# Patient Record
Sex: FEMALE | Race: WHITE | NOT HISPANIC OR LATINO | Employment: OTHER | URBAN - METROPOLITAN AREA
[De-identification: names, ages, dates, MRNs, and addresses within clinical notes are randomized per-mention and may not be internally consistent; named-entity substitution may affect disease eponyms.]

---

## 2017-01-01 ENCOUNTER — HOSPITAL ENCOUNTER (INPATIENT)
Facility: HOSPITAL | Age: 82
LOS: 4 days | End: 2017-05-27
Attending: EMERGENCY MEDICINE | Admitting: INTERNAL MEDICINE

## 2017-01-01 ENCOUNTER — HOSPITAL ENCOUNTER (INPATIENT)
Facility: HOSPITAL | Age: 82
LOS: 3 days | Discharge: NURSING, REHAB OR LONG-TERM CARE | End: 2017-03-10
Attending: EMERGENCY MEDICINE | Admitting: FAMILY MEDICINE

## 2017-01-01 ENCOUNTER — APPOINTMENT (OUTPATIENT)
Dept: GENERAL RADIOLOGY | Facility: HOSPITAL | Age: 82
End: 2017-01-01

## 2017-01-01 ENCOUNTER — APPOINTMENT (OUTPATIENT)
Dept: CARDIOLOGY | Facility: HOSPITAL | Age: 82
End: 2017-01-01
Attending: INTERNAL MEDICINE

## 2017-01-01 ENCOUNTER — APPOINTMENT (OUTPATIENT)
Dept: ULTRASOUND IMAGING | Facility: HOSPITAL | Age: 82
End: 2017-01-01

## 2017-01-01 ENCOUNTER — HOSPITAL ENCOUNTER (OUTPATIENT)
Dept: OTHER | Facility: HOSPITAL | Age: 82
Discharge: HOME | End: 2017-01-04

## 2017-01-01 ENCOUNTER — APPOINTMENT (OUTPATIENT)
Dept: CT IMAGING | Facility: HOSPITAL | Age: 82
End: 2017-01-01
Attending: INTERNAL MEDICINE

## 2017-01-01 VITALS
RESPIRATION RATE: 4 BRPM | WEIGHT: 138.8 LBS | DIASTOLIC BLOOD PRESSURE: 66 MMHG | TEMPERATURE: 98.4 F | OXYGEN SATURATION: 52 % | SYSTOLIC BLOOD PRESSURE: 106 MMHG | BODY MASS INDEX: 25.54 KG/M2 | HEIGHT: 62 IN

## 2017-01-01 VITALS
DIASTOLIC BLOOD PRESSURE: 66 MMHG | RESPIRATION RATE: 16 BRPM | OXYGEN SATURATION: 91 % | BODY MASS INDEX: 22.21 KG/M2 | HEART RATE: 105 BPM | WEIGHT: 125.38 LBS | TEMPERATURE: 97 F | HEIGHT: 63 IN | SYSTOLIC BLOOD PRESSURE: 103 MMHG

## 2017-01-01 DIAGNOSIS — J44.1 COPD EXACERBATION (HCC): ICD-10-CM

## 2017-01-01 DIAGNOSIS — B86 SCABIES: ICD-10-CM

## 2017-01-01 DIAGNOSIS — Z74.09 IMPAIRED MOBILITY AND ADLS: ICD-10-CM

## 2017-01-01 DIAGNOSIS — J96.01 ACUTE RESPIRATORY FAILURE WITH HYPOXIA (HCC): Primary | ICD-10-CM

## 2017-01-01 DIAGNOSIS — Z74.09 IMPAIRED FUNCTIONAL MOBILITY, BALANCE, GAIT, AND ENDURANCE: ICD-10-CM

## 2017-01-01 DIAGNOSIS — E87.6 HYPOKALEMIA: ICD-10-CM

## 2017-01-01 DIAGNOSIS — Z78.9 IMPAIRED MOBILITY AND ADLS: ICD-10-CM

## 2017-01-01 DIAGNOSIS — A41.9 SEPSIS, DUE TO UNSPECIFIED ORGANISM: ICD-10-CM

## 2017-01-01 DIAGNOSIS — J96.22 ACUTE ON CHRONIC RESPIRATORY FAILURE WITH HYPOXIA AND HYPERCAPNIA (HCC): Primary | ICD-10-CM

## 2017-01-01 DIAGNOSIS — R06.02 SHORTNESS OF BREATH: ICD-10-CM

## 2017-01-01 DIAGNOSIS — J96.21 ACUTE ON CHRONIC RESPIRATORY FAILURE WITH HYPOXIA AND HYPERCAPNIA (HCC): Primary | ICD-10-CM

## 2017-01-01 DIAGNOSIS — J18.9 PNEUMONIA OF BOTH LUNGS DUE TO INFECTIOUS ORGANISM, UNSPECIFIED PART OF LUNG: ICD-10-CM

## 2017-01-01 DIAGNOSIS — R41.0 CONFUSION: ICD-10-CM

## 2017-01-01 DIAGNOSIS — R13.10 DYSPHAGIA, UNSPECIFIED TYPE: ICD-10-CM

## 2017-01-01 DIAGNOSIS — R53.1 WEAKNESS: ICD-10-CM

## 2017-01-01 LAB
ALBUMIN SERPL-MCNC: 3.4 G/DL (ref 3.2–4.8)
ALBUMIN SERPL-MCNC: 4.3 G/DL (ref 3.2–4.8)
ALBUMIN/GLOB SERPL: 1.3 G/DL (ref 1.5–2.5)
ALBUMIN/GLOB SERPL: 1.5 G/DL (ref 1.5–2.5)
ALP SERPL-CCNC: 111 U/L (ref 25–100)
ALP SERPL-CCNC: 97 U/L (ref 25–100)
ALT SERPL W P-5'-P-CCNC: 15 U/L (ref 7–40)
ALT SERPL W P-5'-P-CCNC: 24 U/L (ref 7–40)
ANION GAP SERPL CALCULATED.3IONS-SCNC: 10 MMOL/L (ref 3–11)
ANION GAP SERPL CALCULATED.3IONS-SCNC: 10 MMOL/L (ref 3–11)
ANION GAP SERPL CALCULATED.3IONS-SCNC: 13 MMOL/L (ref 3–11)
ANION GAP SERPL CALCULATED.3IONS-SCNC: 2 MMOL/L (ref 3–11)
ANION GAP SERPL CALCULATED.3IONS-SCNC: 3 MMOL/L (ref 3–11)
ANION GAP SERPL CALCULATED.3IONS-SCNC: 3 MMOL/L (ref 3–11)
ANION GAP SERPL CALCULATED.3IONS-SCNC: 5 MMOL/L (ref 3–11)
ANION GAP SERPL CALCULATED.3IONS-SCNC: 9 MMOL/L (ref 3–11)
AORTIC DIMENSIONLESS INDEX: 0.4 CM2
ARTERIAL PATENCY WRIST A: ABNORMAL
ARTERIAL PATENCY WRIST A: POSITIVE
AST SERPL-CCNC: 19 U/L (ref 0–33)
AST SERPL-CCNC: 33 U/L (ref 0–33)
ATMOSPHERIC PRESS: ABNORMAL MMHG
BACTERIA BLD CULT: ABNORMAL
BACTERIA SPEC AEROBE CULT: ABNORMAL
BACTERIA SPEC AEROBE CULT: NORMAL
BACTERIA SPEC AEROBE CULT: NORMAL
BACTERIA SPEC RESP CULT: ABNORMAL
BACTERIA UR QL AUTO: ABNORMAL /HPF
BACTERIA UR QL AUTO: ABNORMAL /HPF
BASE EXCESS BLDA CALC-SCNC: 9 MMOL/L (ref 0–2)
BASE EXCESS BLDA CALC-SCNC: 9.8 MMOL/L (ref 0–2)
BASE EXCESS BLDV CALC-SCNC: 12 MMOL/L (ref -2–2)
BASOPHILS # BLD AUTO: 0.02 10*3/MM3 (ref 0–0.2)
BASOPHILS # BLD AUTO: 0.02 10*3/MM3 (ref 0–0.2)
BASOPHILS # BLD AUTO: 0.03 10*3/MM3 (ref 0–0.2)
BASOPHILS # BLD AUTO: 0.04 10*3/MM3 (ref 0–0.2)
BASOPHILS # BLD AUTO: 0.07 THOUS (ref 0–0.2)
BASOPHILS NFR BLD AUTO: 0.1 % (ref 0–1)
BASOPHILS NFR BLD AUTO: 0.1 % (ref 0–1)
BASOPHILS NFR BLD AUTO: 0.2 % (ref 0–1)
BASOPHILS NFR BLD AUTO: 0.4 % (ref 0–1)
BASOPHILS NFR BLD AUTO: 0.7 % (ref 0–2.5)
BDY SITE: ABNORMAL
BH CV ECHO MEAS - AI DEC SLOPE: 262.4 CM/SEC^2
BH CV ECHO MEAS - AI MAX PG: 46.1 MMHG
BH CV ECHO MEAS - AI MAX VEL: 339.3 CM/SEC
BH CV ECHO MEAS - AI P1/2T: 378.8 MSEC
BH CV ECHO MEAS - AO MAX PG (FULL): 54 MMHG
BH CV ECHO MEAS - AO MAX PG: 53.7 MMHG
BH CV ECHO MEAS - AO MEAN PG (FULL): 28.7 MMHG
BH CV ECHO MEAS - AO MEAN PG: 32 MMHG
BH CV ECHO MEAS - AO ROOT AREA (BSA CORRECTED): 1.8
BH CV ECHO MEAS - AO ROOT AREA: 7 CM^2
BH CV ECHO MEAS - AO ROOT DIAM: 3 CM
BH CV ECHO MEAS - AO V2 MAX: 368 CM/SEC
BH CV ECHO MEAS - AO V2 MEAN: 269.4 CM/SEC
BH CV ECHO MEAS - AO V2 VTI: 67 CM
BH CV ECHO MEAS - AVA(I,A): 0.95 CM^2
BH CV ECHO MEAS - AVA(I,D): 1.1 CM^2
BH CV ECHO MEAS - AVA(V,A): 1 CM^2
BH CV ECHO MEAS - AVA(V,D): 1 CM^2
BH CV ECHO MEAS - BSA(HAYCOCK): 1.7 M^2
BH CV ECHO MEAS - BSA: 1.6 M^2
BH CV ECHO MEAS - BZI_BMI: 25.2 KILOGRAMS/M^2
BH CV ECHO MEAS - BZI_METRIC_HEIGHT: 157.5 CM
BH CV ECHO MEAS - BZI_METRIC_WEIGHT: 62.6 KG
BH CV ECHO MEAS - EDV(CUBED): 118.9 ML
BH CV ECHO MEAS - EDV(TEICH): 113.7 ML
BH CV ECHO MEAS - EF(CUBED): 66.9 %
BH CV ECHO MEAS - EF(TEICH): 58.3 %
BH CV ECHO MEAS - ESV(CUBED): 39.3 ML
BH CV ECHO MEAS - ESV(TEICH): 47.5 ML
BH CV ECHO MEAS - FS: 30.8 %
BH CV ECHO MEAS - IVS/LVPW: 1
BH CV ECHO MEAS - IVSD: 1.1 CM
BH CV ECHO MEAS - LA DIMENSION: 3 CM
BH CV ECHO MEAS - LA/AO: 1
BH CV ECHO MEAS - LV MASS(C)D: 211.6 GRAMS
BH CV ECHO MEAS - LV MASS(C)DI: 129.6 GRAMS/M^2
BH CV ECHO MEAS - LV MAX PG: 5.8 MMHG
BH CV ECHO MEAS - LV MEAN PG: 2.9 MMHG
BH CV ECHO MEAS - LV V1 MAX: 120.9 CM/SEC
BH CV ECHO MEAS - LV V1 MEAN: 75.6 CM/SEC
BH CV ECHO MEAS - LV V1 VTI: 24.4 CM
BH CV ECHO MEAS - LVIDD: 4.9 CM
BH CV ECHO MEAS - LVIDS: 3.4 CM
BH CV ECHO MEAS - LVOT AREA (M): 3.1 CM^2
BH CV ECHO MEAS - LVOT AREA: 3.1 CM^2
BH CV ECHO MEAS - LVOT DIAM: 2 CM
BH CV ECHO MEAS - LVPWD: 1.1 CM
BH CV ECHO MEAS - MV A MAX VEL: 139.2 CM/SEC
BH CV ECHO MEAS - MV DEC SLOPE: 485.1 CM/SEC^2
BH CV ECHO MEAS - MV DEC TIME: 0.31 SEC
BH CV ECHO MEAS - MV E MAX VEL: 105.1 CM/SEC
BH CV ECHO MEAS - MV E/A: 0.76
BH CV ECHO MEAS - MV P1/2T MAX VEL: 102.6 CM/SEC
BH CV ECHO MEAS - MV P1/2T: 61.9 MSEC
BH CV ECHO MEAS - MVA P1/2T LCG: 2.1 CM^2
BH CV ECHO MEAS - MVA(P1/2T): 3.6 CM^2
BH CV ECHO MEAS - PA ACC SLOPE: 1016 CM/SEC^2
BH CV ECHO MEAS - PA ACC TIME: 0.1 SEC
BH CV ECHO MEAS - PA PR(ACCEL): 32.7 MMHG
BH CV ECHO MEAS - RAP SYSTOLE: 3 MMHG
BH CV ECHO MEAS - RV MAX PG: 2.1 MMHG
BH CV ECHO MEAS - RV V1 MAX: 72.1 CM/SEC
BH CV ECHO MEAS - RVSP: 31.2 MMHG
BH CV ECHO MEAS - SI(AO): 338.1 ML/M^2
BH CV ECHO MEAS - SI(CUBED): 48.7 ML/M^2
BH CV ECHO MEAS - SI(LVOT): 45.8 ML/M^2
BH CV ECHO MEAS - SI(TEICH): 40.6 ML/M^2
BH CV ECHO MEAS - SV(AO): 552 ML
BH CV ECHO MEAS - SV(CUBED): 79.5 ML
BH CV ECHO MEAS - SV(LVOT): 74.7 ML
BH CV ECHO MEAS - SV(TEICH): 66.3 ML
BH CV ECHO MEAS - TAPSE (>1.6): 1.8 CM2
BH CV ECHO MEAS - TR MAX VEL: 265.4 CM/SEC
BH CV XLRA - RV BASE: 3.2 CM
BH CV XLRA - RV LENGTH: 6 CM
BH CV XLRA - RV MID: 3.3 CM
BH CV XLRA - TDI S': 13 CM/SEC
BILIRUB SERPL-MCNC: 0.3 MG/DL (ref 0.3–1.2)
BILIRUB SERPL-MCNC: 0.4 MG/DL (ref 0.3–1.2)
BILIRUB UR QL STRIP: NEGATIVE
BNP SERPL-MCNC: 128 PG/ML (ref 0–100)
BNP SERPL-MCNC: 144 PG/ML (ref 0–100)
BUN BLD-MCNC: 13 MG/DL (ref 9–23)
BUN BLD-MCNC: 14 MG/DL (ref 9–23)
BUN BLD-MCNC: 15 MG/DL (ref 9–23)
BUN BLD-MCNC: 22 MG/DL (ref 9–23)
BUN BLD-MCNC: 25 MG/DL (ref 9–23)
BUN BLD-MCNC: 26 MG/DL (ref 9–23)
BUN BLD-MCNC: 29 MG/DL (ref 9–23)
BUN BLD-MCNC: 31 MG/DL (ref 9–23)
BUN/CREAT SERPL: 21.7 (ref 7–25)
BUN/CREAT SERPL: 23.3 (ref 7–25)
BUN/CREAT SERPL: 30 (ref 7–25)
BUN/CREAT SERPL: 32.5 (ref 7–25)
BUN/CREAT SERPL: 35.7 (ref 7–25)
BUN/CREAT SERPL: 36.3 (ref 7–25)
BUN/CREAT SERPL: 44 (ref 7–25)
BUN/CREAT SERPL: 44.3 (ref 7–25)
CA-I SERPL ISE-MCNC: 1.11 MMOL/L (ref 1.12–1.32)
CALCIUM SPEC-SCNC: 9.1 MG/DL (ref 8.7–10.4)
CALCIUM SPEC-SCNC: 9.2 MG/DL (ref 8.7–10.4)
CALCIUM SPEC-SCNC: 9.2 MG/DL (ref 8.7–10.4)
CALCIUM SPEC-SCNC: 9.3 MG/DL (ref 8.7–10.4)
CALCIUM SPEC-SCNC: 9.5 MG/DL (ref 8.7–10.4)
CALCIUM SPEC-SCNC: 9.5 MG/DL (ref 8.7–10.4)
CALCIUM SPEC-SCNC: 9.7 MG/DL (ref 8.7–10.4)
CALCIUM SPEC-SCNC: 9.7 MG/DL (ref 8.7–10.4)
CHLORIDE SERPL-SCNC: 100 MMOL/L (ref 99–109)
CHLORIDE SERPL-SCNC: 101 MMOL/L (ref 99–109)
CHLORIDE SERPL-SCNC: 101 MMOL/L (ref 99–109)
CHLORIDE SERPL-SCNC: 105 MMOL/L (ref 99–109)
CHLORIDE SERPL-SCNC: 106 MMOL/L (ref 99–109)
CHLORIDE SERPL-SCNC: 92 MMOL/L (ref 99–109)
CHLORIDE SERPL-SCNC: 93 MMOL/L (ref 99–109)
CHLORIDE SERPL-SCNC: 96 MMOL/L (ref 99–109)
CLARITY UR: ABNORMAL
CLARITY UR: CLEAR
CO2 BLDA-SCNC: 35.7 MMOL/L (ref 22–33)
CO2 BLDA-SCNC: 35.7 MMOL/L (ref 22–33)
CO2 BLDA-SCNC: 40.4 MMOL/L (ref 22–33)
CO2 SERPL-SCNC: 32 MMOL/L (ref 20–31)
CO2 SERPL-SCNC: 33 MMOL/L (ref 20–31)
CO2 SERPL-SCNC: 34 MMOL/L (ref 20–31)
CO2 SERPL-SCNC: 34 MMOL/L (ref 20–31)
CO2 SERPL-SCNC: 36 MMOL/L (ref 20–31)
CO2 SERPL-SCNC: 36 MMOL/L (ref 20–31)
CO2 SERPL-SCNC: 38 MMOL/L (ref 20–31)
CO2 SERPL-SCNC: 40 MMOL/L (ref 20–31)
COHGB MFR BLD: 1.7 % (ref 0–2)
COHGB MFR BLD: 1.8 % (ref 0–2)
COHGB MFR BLD: 1.9 % (ref 0–2)
COLOR UR: YELLOW
CONV ABS IMM GRAN: 0.05 THOUS (ref 0–0.6)
CONV IMMATURE GRAN: 0.5 % (ref 0–2.5)
CREAT BLD-MCNC: 0.5 MG/DL (ref 0.6–1.3)
CREAT BLD-MCNC: 0.5 MG/DL (ref 0.6–1.3)
CREAT BLD-MCNC: 0.6 MG/DL (ref 0.6–1.3)
CREAT BLD-MCNC: 0.6 MG/DL (ref 0.6–1.3)
CREAT BLD-MCNC: 0.7 MG/DL (ref 0.6–1.3)
CREAT BLD-MCNC: 0.7 MG/DL (ref 0.6–1.3)
CREAT BLD-MCNC: 0.8 MG/DL (ref 0.6–1.3)
CREAT BLD-MCNC: 0.8 MG/DL (ref 0.6–1.3)
CRP SERPL-MCNC: 82.7 MG/DL (ref 0–10)
D-LACTATE SERPL-SCNC: 1.8 MMOL/L (ref 0.5–2)
D-LACTATE SERPL-SCNC: 2.1 MMOL/L (ref 0.5–2)
D-LACTATE SERPL-SCNC: 2.6 MMOL/L (ref 0.5–2)
D-LACTATE SERPL-SCNC: 2.9 MMOL/L (ref 0.5–2)
DEPRECATED RDW RBC AUTO: 55.4 FL (ref 37–54)
DEPRECATED RDW RBC AUTO: 56.3 FL (ref 37–54)
DEPRECATED RDW RBC AUTO: 66.9 FL (ref 37–54)
DEPRECATED RDW RBC AUTO: 70 FL (ref 37–54)
DEPRECATED RDW RBC AUTO: 71.7 FL (ref 37–54)
EOSINOPHIL # BLD AUTO: 0 10*3/MM3 (ref 0.1–0.3)
EOSINOPHIL # BLD AUTO: 0 10*3/MM3 (ref 0.1–0.3)
EOSINOPHIL # BLD AUTO: 0.05 10*3/MM3 (ref 0.1–0.3)
EOSINOPHIL # BLD AUTO: 0.1 THOUS (ref 0–0.7)
EOSINOPHIL # BLD AUTO: 0.3 10*3/MM3 (ref 0.1–0.3)
EOSINOPHIL # BLD AUTO: 1.1 % (ref 0–7)
EOSINOPHIL NFR BLD AUTO: 0 % (ref 0–3)
EOSINOPHIL NFR BLD AUTO: 0 % (ref 0–3)
EOSINOPHIL NFR BLD AUTO: 0.3 % (ref 0–3)
EOSINOPHIL NFR BLD AUTO: 3.3 % (ref 0–3)
ERYTHROCYTE [DISTWIDTH] IN BLOOD BY AUTOMATED COUNT: 15.3 % (ref 11.3–14.5)
ERYTHROCYTE [DISTWIDTH] IN BLOOD BY AUTOMATED COUNT: 15.6 % (ref 11.3–14.5)
ERYTHROCYTE [DISTWIDTH] IN BLOOD BY AUTOMATED COUNT: 16.9 % (ref 11.5–14.5)
ERYTHROCYTE [DISTWIDTH] IN BLOOD BY AUTOMATED COUNT: 18.8 % (ref 11.3–14.5)
ERYTHROCYTE [DISTWIDTH] IN BLOOD BY AUTOMATED COUNT: 19.3 % (ref 11.3–14.5)
ERYTHROCYTE [DISTWIDTH] IN BLOOD BY AUTOMATED COUNT: 19.5 % (ref 11.3–14.5)
GFR SERPL CREATININE-BSD FRML MDRD: 115 ML/MIN/1.73
GFR SERPL CREATININE-BSD FRML MDRD: 115 ML/MIN/1.73
GFR SERPL CREATININE-BSD FRML MDRD: 67 ML/MIN/1.73
GFR SERPL CREATININE-BSD FRML MDRD: 67 ML/MIN/1.73
GFR SERPL CREATININE-BSD FRML MDRD: 78 ML/MIN/1.73
GFR SERPL CREATININE-BSD FRML MDRD: 78 ML/MIN/1.73
GFR SERPL CREATININE-BSD FRML MDRD: 93 ML/MIN/1.73
GFR SERPL CREATININE-BSD FRML MDRD: 93 ML/MIN/1.73
GLOBULIN UR ELPH-MCNC: 2.6 GM/DL
GLOBULIN UR ELPH-MCNC: 2.9 GM/DL
GLUCOSE BLD-MCNC: 102 MG/DL (ref 70–100)
GLUCOSE BLD-MCNC: 105 MG/DL (ref 70–100)
GLUCOSE BLD-MCNC: 113 MG/DL (ref 70–100)
GLUCOSE BLD-MCNC: 169 MG/DL (ref 70–100)
GLUCOSE BLD-MCNC: 174 MG/DL (ref 70–100)
GLUCOSE BLD-MCNC: 206 MG/DL (ref 70–100)
GLUCOSE BLD-MCNC: 80 MG/DL (ref 70–100)
GLUCOSE BLD-MCNC: 99 MG/DL (ref 70–100)
GLUCOSE BLDC GLUCOMTR-MCNC: 114 MG/DL (ref 70–130)
GLUCOSE BLDC GLUCOMTR-MCNC: 159 MG/DL (ref 70–130)
GLUCOSE BLDC GLUCOMTR-MCNC: 170 MG/DL (ref 70–130)
GLUCOSE BLDC GLUCOMTR-MCNC: 177 MG/DL (ref 70–130)
GLUCOSE BLDC GLUCOMTR-MCNC: 194 MG/DL (ref 70–130)
GLUCOSE BLDC GLUCOMTR-MCNC: 195 MG/DL (ref 70–130)
GLUCOSE BLDC GLUCOMTR-MCNC: 199 MG/DL (ref 70–130)
GLUCOSE BLDC GLUCOMTR-MCNC: 234 MG/DL (ref 70–130)
GLUCOSE BLDC GLUCOMTR-MCNC: 305 MG/DL (ref 70–130)
GLUCOSE BLDC GLUCOMTR-MCNC: 348 MG/DL (ref 70–130)
GLUCOSE UR STRIP-MCNC: NEGATIVE MG/DL
GRAM STN SPEC: ABNORMAL
HBA1C MFR BLD: 5.9 % (ref 4.8–5.6)
HCO3 BLDA-SCNC: 34.1 MMOL/L (ref 20–26)
HCO3 BLDA-SCNC: 34.2 MMOL/L (ref 20–26)
HCO3 BLDV-SCNC: 38.6 MMOL/L (ref 22–28)
HCT VFR BLD AUTO: 35.3 % (ref 34.5–44)
HCT VFR BLD AUTO: 35.7 % (ref 34.5–44)
HCT VFR BLD AUTO: 40.5 % (ref 34.5–44)
HCT VFR BLD AUTO: 45 % (ref 37–47)
HCT VFR BLD AUTO: 46.3 % (ref 34.5–44)
HCT VFR BLD AUTO: 47.4 % (ref 34.5–44)
HCT VFR BLD CALC: 32.8 %
HCT VFR BLD CALC: 34.1 %
HGB BLD-MCNC: 10.2 G/DL (ref 11.5–15.5)
HGB BLD-MCNC: 10.6 G/DL (ref 11.5–15.5)
HGB BLD-MCNC: 12 G/DL (ref 11.5–15.5)
HGB BLD-MCNC: 13.6 G/DL (ref 12–16)
HGB BLD-MCNC: 14.4 G/DL (ref 11.5–15.5)
HGB BLD-MCNC: 14.6 G/DL (ref 11.5–15.5)
HGB BLDA-MCNC: 10.7 G/DL (ref 14–18)
HGB BLDA-MCNC: 11.1 G/DL (ref 14–18)
HGB BLDA-MCNC: 16.1 G/DL (ref 14–18)
HGB UR QL STRIP.AUTO: NEGATIVE
HOLD SPECIMEN: NORMAL
HOROWITZ INDEX BLD+IHG-RTO: 28 %
HOROWITZ INDEX BLD+IHG-RTO: 60 %
HOROWITZ INDEX BLD+IHG-RTO: 80 %
HYALINE CASTS UR QL AUTO: ABNORMAL /LPF
HYALINE CASTS UR QL AUTO: ABNORMAL /LPF
IMM GRANULOCYTES # BLD: 0.03 10*3/MM3 (ref 0–0.03)
IMM GRANULOCYTES # BLD: 0.09 10*3/MM3 (ref 0–0.03)
IMM GRANULOCYTES # BLD: 0.12 10*3/MM3 (ref 0–0.03)
IMM GRANULOCYTES # BLD: 0.13 10*3/MM3 (ref 0–0.03)
IMM GRANULOCYTES NFR BLD: 0.3 % (ref 0–0.6)
IMM GRANULOCYTES NFR BLD: 0.4 % (ref 0–0.6)
IMM GRANULOCYTES NFR BLD: 0.6 % (ref 0–0.6)
IMM GRANULOCYTES NFR BLD: 0.7 % (ref 0–0.6)
INR PPP: 0.97
ISOLATED FROM: ABNORMAL
KETONES UR QL STRIP: NEGATIVE
LEFT ATRIUM VOLUME INDEX: 15.3 ML/M2
LEFT ATRIUM VOLUME: 25 CM3
LEUKOCYTE ESTERASE UR QL STRIP.AUTO: ABNORMAL
LEUKOCYTE ESTERASE UR QL STRIP.AUTO: ABNORMAL
LEUKOCYTE ESTERASE UR QL STRIP.AUTO: NEGATIVE
LEUKOCYTE ESTERASE UR QL STRIP.AUTO: NEGATIVE
LV EF 2D ECHO EST: 60 %
LYMPHOCYTES # BLD AUTO: 0.23 10*3/MM3 (ref 0.6–4.8)
LYMPHOCYTES # BLD AUTO: 0.25 10*3/MM3 (ref 0.6–4.8)
LYMPHOCYTES # BLD AUTO: 0.62 10*3/MM3 (ref 0.6–4.8)
LYMPHOCYTES # BLD AUTO: 0.82 10*3/MM3 (ref 0.6–4.8)
LYMPHOCYTES # BLD AUTO: 0.91 THOUS (ref 0.6–3.4)
LYMPHOCYTES NFR BLD AUTO: 1 % (ref 24–44)
LYMPHOCYTES NFR BLD AUTO: 1.2 % (ref 24–44)
LYMPHOCYTES NFR BLD AUTO: 4.8 % (ref 24–44)
LYMPHOCYTES NFR BLD AUTO: 6.8 % (ref 24–44)
LYMPHOCYTES NFR BLD AUTO: 9.7 % (ref 10–50)
MAGNESIUM SERPL-MCNC: 1.9 MG/DL (ref 1.3–2.7)
MAGNESIUM SERPL-MCNC: 2.1 MG/DL (ref 1.3–2.7)
MAGNESIUM SERPL-MCNC: 2.9 MG/DL (ref 1.3–2.7)
MCH RBC QN AUTO: 29.1 PG (ref 27–31)
MCH RBC QN AUTO: 29.2 PG (ref 27–31)
MCH RBC QN AUTO: 29.4 UUG (ref 27–31)
MCH RBC QN AUTO: 29.7 PG (ref 27–31)
MCH RBC QN AUTO: 30.2 PG (ref 27–31)
MCH RBC QN AUTO: 30.5 PG (ref 27–31)
MCHC RBC AUTO-ENTMCNC: 28.9 G/DL (ref 32–36)
MCHC RBC AUTO-ENTMCNC: 29.6 G/DL (ref 32–36)
MCHC RBC AUTO-ENTMCNC: 29.7 G/DL (ref 32–36)
MCHC RBC AUTO-ENTMCNC: 30.6 G/DL (ref 30–37)
MCHC RBC AUTO-ENTMCNC: 30.8 G/DL (ref 32–36)
MCHC RBC AUTO-ENTMCNC: 31.1 G/DL (ref 32–36)
MCV RBC AUTO: 100.2 FL (ref 80–99)
MCV RBC AUTO: 100.9 FL (ref 80–99)
MCV RBC AUTO: 96.1 FL (ref 81–99)
MCV RBC AUTO: 97.9 FL (ref 80–99)
MCV RBC AUTO: 98.1 FL (ref 80–99)
MCV RBC AUTO: 98.3 FL (ref 80–99)
METHGB BLD QL: 0.5 % (ref 0–1.5)
METHGB BLD QL: 0.6 % (ref 0–1.5)
METHGB BLD QL: 0.8 % (ref 0–1.5)
MODALITY: ABNORMAL
MONOCYTES # BLD AUTO: 0.49 THOUS (ref 0–0.9)
MONOCYTES # BLD AUTO: 0.51 10*3/MM3 (ref 0–1)
MONOCYTES # BLD AUTO: 0.54 10*3/MM3 (ref 0–1)
MONOCYTES # BLD AUTO: 0.55 10*3/MM3 (ref 0–1)
MONOCYTES # BLD AUTO: 0.8 10*3/MM3 (ref 0–1)
MONOCYTES NFR BLD AUTO: 2.4 % (ref 0–12)
MONOCYTES NFR BLD AUTO: 2.5 % (ref 0–12)
MONOCYTES NFR BLD AUTO: 4.7 % (ref 0–12)
MONOCYTES NFR BLD AUTO: 6 % (ref 0–12)
MONOCYTES NFR BLD MANUAL: 5.2 % (ref 0–12)
NEUTROPHILS # BLD AUTO: 15.36 10*3/MM3 (ref 1.5–8.3)
NEUTROPHILS # BLD AUTO: 19.53 10*3/MM3 (ref 1.5–8.3)
NEUTROPHILS # BLD AUTO: 21.92 10*3/MM3 (ref 1.5–8.3)
NEUTROPHILS # BLD AUTO: 7.58 10*3/MM3 (ref 1.5–8.3)
NEUTROPHILS # BLD MANUAL: 7.76 THOUS (ref 2–6.9)
NEUTROPHILS NFR BLD AUTO: 82.8 % (ref 37–80)
NEUTROPHILS NFR BLD AUTO: 83.2 % (ref 41–71)
NEUTROPHILS NFR BLD AUTO: 89.3 % (ref 41–71)
NEUTROPHILS NFR BLD AUTO: 95.8 % (ref 41–71)
NEUTROPHILS NFR BLD AUTO: 95.9 % (ref 41–71)
NITRITE UR QL STRIP: NEGATIVE
OXYHGB MFR BLDV: 54.3 % (ref 94–99)
OXYHGB MFR BLDV: 94.1 % (ref 94–99)
OXYHGB MFR BLDV: 97.4 % (ref 94–99)
PCO2 BLDA: 46.2 MM HG (ref 35–45)
PCO2 BLDA: 51.1 MM HG (ref 35–45)
PCO2 BLDV: 61 MM HG (ref 41–51)
PH BLDA: 7.43 PH UNITS (ref 7.35–7.45)
PH BLDA: 7.48 PH UNITS (ref 7.35–7.45)
PH BLDV: 7.41 [PH] (ref 7.25–7.5)
PH UR STRIP.AUTO: <=5 [PH] (ref 5–8)
PHOSPHATE SERPL-MCNC: 3.8 MG/DL (ref 2.4–5.1)
PLATELET # BLD AUTO: 196 10*3/MM3 (ref 150–450)
PLATELET # BLD AUTO: 248 10*3/MM3 (ref 150–450)
PLATELET # BLD AUTO: 249 THOUS (ref 130–400)
PLATELET # BLD AUTO: 281 10*3/MM3 (ref 150–450)
PLATELET # BLD AUTO: 317 10*3/MM3 (ref 150–450)
PLATELET # BLD AUTO: 322 10*3/MM3 (ref 150–450)
PMV BLD AUTO: 10.6 FL (ref 6–12)
PMV BLD AUTO: 10.7 FL (ref 6–12)
PMV BLD AUTO: 9.6 FL (ref 6–12)
PMV BLD AUTO: 9.7 FL (ref 6–12)
PMV BLD AUTO: 9.8 FL (ref 6–12)
PO2 BLDA: 126 MM HG (ref 83–108)
PO2 BLDA: 77.8 MM HG (ref 83–108)
PO2 BLDV: 33.2 MM HG (ref 27–53)
POTASSIUM BLD-SCNC: 3 MMOL/L (ref 3.5–5.5)
POTASSIUM BLD-SCNC: 3.3 MMOL/L (ref 3.5–5.5)
POTASSIUM BLD-SCNC: 3.4 MMOL/L (ref 3.5–5.5)
POTASSIUM BLD-SCNC: 3.7 MMOL/L (ref 3.5–5.5)
POTASSIUM BLD-SCNC: 3.7 MMOL/L (ref 3.5–5.5)
POTASSIUM BLD-SCNC: 4 MMOL/L (ref 3.5–5.5)
POTASSIUM BLD-SCNC: 4.8 MMOL/L (ref 3.5–5.5)
POTASSIUM BLD-SCNC: 5 MMOL/L (ref 3.5–5.5)
PROCALCITONIN SERPL-MCNC: 2.25 NG/ML
PROT SERPL-MCNC: 6 G/DL (ref 5.7–8.2)
PROT SERPL-MCNC: 7.2 G/DL (ref 5.7–8.2)
PROT UR QL STRIP: ABNORMAL
PROT UR QL STRIP: NEGATIVE
PROTHROMBIN TIME: 10.6 SECONDS (ref 9.6–11.5)
RBC # BLD AUTO: 3.5 10*6/MM3 (ref 3.89–5.14)
RBC # BLD AUTO: 3.63 10*6/MM3 (ref 3.89–5.14)
RBC # BLD AUTO: 4.04 10*6/MM3 (ref 3.89–5.14)
RBC # BLD AUTO: 4.63 M/UL (ref 4.2–5.4)
RBC # BLD AUTO: 4.72 10*6/MM3 (ref 3.89–5.14)
RBC # BLD AUTO: 4.84 10*6/MM3 (ref 3.89–5.14)
RBC # UR: ABNORMAL /HPF
RBC # UR: ABNORMAL /HPF
REF LAB TEST METHOD: ABNORMAL
REF LAB TEST METHOD: ABNORMAL
SAO2 % BLDCOA: 97.4 %
SODIUM BLD-SCNC: 135 MMOL/L (ref 132–146)
SODIUM BLD-SCNC: 138 MMOL/L (ref 132–146)
SODIUM BLD-SCNC: 139 MMOL/L (ref 132–146)
SODIUM BLD-SCNC: 141 MMOL/L (ref 132–146)
SODIUM BLD-SCNC: 142 MMOL/L (ref 132–146)
SODIUM BLD-SCNC: 142 MMOL/L (ref 132–146)
SODIUM BLD-SCNC: 146 MMOL/L (ref 132–146)
SODIUM BLD-SCNC: 149 MMOL/L (ref 132–146)
SP GR UR STRIP: 1.02 (ref 1–1.03)
SP GR UR STRIP: 1.03 (ref 1–1.03)
SQUAMOUS #/AREA URNS HPF: ABNORMAL /HPF
SQUAMOUS #/AREA URNS HPF: ABNORMAL /HPF
TROPONIN I SERPL-MCNC: 0.02 NG/ML (ref 0–0.07)
TROPONIN I SERPL-MCNC: 0.02 NG/ML (ref 0–0.07)
TSH SERPL DL<=0.05 MIU/L-ACNC: 1.7 MIU/ML (ref 0.35–5.35)
UROBILINOGEN UR QL STRIP: ABNORMAL
UROBILINOGEN UR QL STRIP: ABNORMAL
UROBILINOGEN UR QL STRIP: NORMAL
UROBILINOGEN UR QL STRIP: NORMAL
VANCOMYCIN SERPL-MCNC: 14.9 MCG/ML (ref 5–40)
WBC # BLD AUTO: 9.4 THOUS (ref 4.8–10.8)
WBC NRBC COR # BLD: 17.18 10*3/MM3 (ref 3.5–10.8)
WBC NRBC COR # BLD: 20.4 10*3/MM3 (ref 3.5–10.8)
WBC NRBC COR # BLD: 22.84 10*3/MM3 (ref 3.5–10.8)
WBC NRBC COR # BLD: 8.37 10*3/MM3 (ref 3.5–10.8)
WBC NRBC COR # BLD: 9.12 10*3/MM3 (ref 3.5–10.8)
WBC UR QL AUTO: ABNORMAL /HPF
WBC UR QL AUTO: ABNORMAL /HPF
WHOLE BLOOD HOLD SPECIMEN: NORMAL
YEAST URNS QL MICRO: ABNORMAL /HPF

## 2017-01-01 PROCEDURE — 94660 CPAP INITIATION&MGMT: CPT

## 2017-01-01 PROCEDURE — 97162 PT EVAL MOD COMPLEX 30 MIN: CPT

## 2017-01-01 PROCEDURE — 94640 AIRWAY INHALATION TREATMENT: CPT

## 2017-01-01 PROCEDURE — 25010000002 VANCOMYCIN HCL IN NACL 1.25-0.9 GM/250ML-% SOLUTION: Performed by: EMERGENCY MEDICINE

## 2017-01-01 PROCEDURE — 99291 CRITICAL CARE FIRST HOUR: CPT | Performed by: INTERNAL MEDICINE

## 2017-01-01 PROCEDURE — 81003 URINALYSIS AUTO W/O SCOPE: CPT | Performed by: EMERGENCY MEDICINE

## 2017-01-01 PROCEDURE — 94799 UNLISTED PULMONARY SVC/PX: CPT

## 2017-01-01 PROCEDURE — 84145 PROCALCITONIN (PCT): CPT | Performed by: NURSE PRACTITIONER

## 2017-01-01 PROCEDURE — 5A09357 ASSISTANCE WITH RESPIRATORY VENTILATION, LESS THAN 24 CONSECUTIVE HOURS, CONTINUOUS POSITIVE AIRWAY PRESSURE: ICD-10-PCS | Performed by: EMERGENCY MEDICINE

## 2017-01-01 PROCEDURE — 25010000002 HEPARIN (PORCINE) PER 1000 UNITS: Performed by: INTERNAL MEDICINE

## 2017-01-01 PROCEDURE — 25010000002 METHYLPREDNISOLONE PER 125 MG: Performed by: NURSE PRACTITIONER

## 2017-01-01 PROCEDURE — 71010 HC CHEST PA OR AP: CPT

## 2017-01-01 PROCEDURE — G0378 HOSPITAL OBSERVATION PER HR: HCPCS

## 2017-01-01 PROCEDURE — 83735 ASSAY OF MAGNESIUM: CPT | Performed by: NURSE PRACTITIONER

## 2017-01-01 PROCEDURE — 83605 ASSAY OF LACTIC ACID: CPT | Performed by: NURSE PRACTITIONER

## 2017-01-01 PROCEDURE — 87086 URINE CULTURE/COLONY COUNT: CPT | Performed by: EMERGENCY MEDICINE

## 2017-01-01 PROCEDURE — 97110 THERAPEUTIC EXERCISES: CPT

## 2017-01-01 PROCEDURE — 97116 GAIT TRAINING THERAPY: CPT

## 2017-01-01 PROCEDURE — 94760 N-INVAS EAR/PLS OXIMETRY 1: CPT

## 2017-01-01 PROCEDURE — 25010000002 METHYLPREDNISOLONE PER 125 MG: Performed by: EMERGENCY MEDICINE

## 2017-01-01 PROCEDURE — 73552 X-RAY EXAM OF FEMUR 2/>: CPT

## 2017-01-01 PROCEDURE — 25010000002 LORAZEPAM PER 2 MG: Performed by: INTERNAL MEDICINE

## 2017-01-01 PROCEDURE — 80053 COMPREHEN METABOLIC PANEL: CPT | Performed by: EMERGENCY MEDICINE

## 2017-01-01 PROCEDURE — 80048 BASIC METABOLIC PNL TOTAL CA: CPT | Performed by: FAMILY MEDICINE

## 2017-01-01 PROCEDURE — 82962 GLUCOSE BLOOD TEST: CPT

## 2017-01-01 PROCEDURE — 71250 CT THORAX DX C-: CPT

## 2017-01-01 PROCEDURE — 25010000002 MORPHINE 100 MG/100ML: Performed by: INTERNAL MEDICINE

## 2017-01-01 PROCEDURE — 83605 ASSAY OF LACTIC ACID: CPT | Performed by: INTERNAL MEDICINE

## 2017-01-01 PROCEDURE — C1894 INTRO/SHEATH, NON-LASER: HCPCS

## 2017-01-01 PROCEDURE — 87150 DNA/RNA AMPLIFIED PROBE: CPT | Performed by: EMERGENCY MEDICINE

## 2017-01-01 PROCEDURE — 81003 URINALYSIS AUTO W/O SCOPE: CPT | Performed by: NURSE PRACTITIONER

## 2017-01-01 PROCEDURE — 80048 BASIC METABOLIC PNL TOTAL CA: CPT | Performed by: INTERNAL MEDICINE

## 2017-01-01 PROCEDURE — 99233 SBSQ HOSP IP/OBS HIGH 50: CPT | Performed by: FAMILY MEDICINE

## 2017-01-01 PROCEDURE — 63710000001 PREDNISONE PER 5 MG: Performed by: FAMILY MEDICINE

## 2017-01-01 PROCEDURE — 87040 BLOOD CULTURE FOR BACTERIA: CPT | Performed by: EMERGENCY MEDICINE

## 2017-01-01 PROCEDURE — 36600 WITHDRAWAL OF ARTERIAL BLOOD: CPT | Performed by: EMERGENCY MEDICINE

## 2017-01-01 PROCEDURE — 97166 OT EVAL MOD COMPLEX 45 MIN: CPT

## 2017-01-01 PROCEDURE — B548ZZA ULTRASONOGRAPHY OF SUPERIOR VENA CAVA, GUIDANCE: ICD-10-PCS | Performed by: NURSE PRACTITIONER

## 2017-01-01 PROCEDURE — 80048 BASIC METABOLIC PNL TOTAL CA: CPT | Performed by: NURSE PRACTITIONER

## 2017-01-01 PROCEDURE — 25010000002 DIPHENHYDRAMINE PER 50 MG: Performed by: INTERNAL MEDICINE

## 2017-01-01 PROCEDURE — 25010000002 METHYLPREDNISOLONE PER 125 MG: Performed by: INTERNAL MEDICINE

## 2017-01-01 PROCEDURE — 85025 COMPLETE CBC W/AUTO DIFF WBC: CPT | Performed by: NURSE PRACTITIONER

## 2017-01-01 PROCEDURE — 85610 PROTHROMBIN TIME: CPT | Performed by: EMERGENCY MEDICINE

## 2017-01-01 PROCEDURE — 63710000001 INSULIN LISPRO (HUMAN) PER 5 UNITS: Performed by: NURSE PRACTITIONER

## 2017-01-01 PROCEDURE — 87070 CULTURE OTHR SPECIMN AEROBIC: CPT | Performed by: NURSE PRACTITIONER

## 2017-01-01 PROCEDURE — 84100 ASSAY OF PHOSPHORUS: CPT | Performed by: NURSE PRACTITIONER

## 2017-01-01 PROCEDURE — 87106 FUNGI IDENTIFICATION YEAST: CPT | Performed by: NURSE PRACTITIONER

## 2017-01-01 PROCEDURE — 25010000002 DIPHENHYDRAMINE PER 50 MG: Performed by: NURSE PRACTITIONER

## 2017-01-01 PROCEDURE — 25010000002 FUROSEMIDE PER 20 MG: Performed by: INTERNAL MEDICINE

## 2017-01-01 PROCEDURE — G8979 MOBILITY GOAL STATUS: HCPCS

## 2017-01-01 PROCEDURE — 83036 HEMOGLOBIN GLYCOSYLATED A1C: CPT | Performed by: NURSE PRACTITIONER

## 2017-01-01 PROCEDURE — 99239 HOSP IP/OBS DSCHRG MGMT >30: CPT | Performed by: NURSE PRACTITIONER

## 2017-01-01 PROCEDURE — 87147 CULTURE TYPE IMMUNOLOGIC: CPT | Performed by: EMERGENCY MEDICINE

## 2017-01-01 PROCEDURE — 84443 ASSAY THYROID STIM HORMONE: CPT | Performed by: NURSE PRACTITIONER

## 2017-01-01 PROCEDURE — 99233 SBSQ HOSP IP/OBS HIGH 50: CPT | Performed by: INTERNAL MEDICINE

## 2017-01-01 PROCEDURE — 99285 EMERGENCY DEPT VISIT HI MDM: CPT

## 2017-01-01 PROCEDURE — 85027 COMPLETE CBC AUTOMATED: CPT | Performed by: INTERNAL MEDICINE

## 2017-01-01 PROCEDURE — 25010000002 MAGNESIUM SULFATE 2 GM/50ML SOLUTION: Performed by: INTERNAL MEDICINE

## 2017-01-01 PROCEDURE — 87186 SC STD MICRODIL/AGAR DIL: CPT | Performed by: EMERGENCY MEDICINE

## 2017-01-01 PROCEDURE — 85025 COMPLETE CBC W/AUTO DIFF WBC: CPT | Performed by: EMERGENCY MEDICINE

## 2017-01-01 PROCEDURE — 83735 ASSAY OF MAGNESIUM: CPT | Performed by: EMERGENCY MEDICINE

## 2017-01-01 PROCEDURE — 25010000002 HEPARIN (PORCINE) PER 1000 UNITS: Performed by: NURSE PRACTITIONER

## 2017-01-01 PROCEDURE — 92612 ENDOSCOPY SWALLOW (FEES) VID: CPT

## 2017-01-01 PROCEDURE — 92610 EVALUATE SWALLOWING FUNCTION: CPT

## 2017-01-01 PROCEDURE — 99232 SBSQ HOSP IP/OBS MODERATE 35: CPT | Performed by: NURSE PRACTITIONER

## 2017-01-01 PROCEDURE — 83605 ASSAY OF LACTIC ACID: CPT | Performed by: EMERGENCY MEDICINE

## 2017-01-01 PROCEDURE — C1751 CATH, INF, PER/CENT/MIDLINE: HCPCS

## 2017-01-01 PROCEDURE — 82805 BLOOD GASES W/O2 SATURATION: CPT | Performed by: EMERGENCY MEDICINE

## 2017-01-01 PROCEDURE — 86140 C-REACTIVE PROTEIN: CPT | Performed by: EMERGENCY MEDICINE

## 2017-01-01 PROCEDURE — 93005 ELECTROCARDIOGRAM TRACING: CPT | Performed by: EMERGENCY MEDICINE

## 2017-01-01 PROCEDURE — 81001 URINALYSIS AUTO W/SCOPE: CPT | Performed by: EMERGENCY MEDICINE

## 2017-01-01 PROCEDURE — 82805 BLOOD GASES W/O2 SATURATION: CPT | Performed by: NURSE PRACTITIONER

## 2017-01-01 PROCEDURE — 99223 1ST HOSP IP/OBS HIGH 75: CPT | Performed by: INTERNAL MEDICINE

## 2017-01-01 PROCEDURE — 87040 BLOOD CULTURE FOR BACTERIA: CPT | Performed by: NURSE PRACTITIONER

## 2017-01-01 PROCEDURE — 76775 US EXAM ABDO BACK WALL LIM: CPT

## 2017-01-01 PROCEDURE — 02HV33Z INSERTION OF INFUSION DEVICE INTO SUPERIOR VENA CAVA, PERCUTANEOUS APPROACH: ICD-10-PCS | Performed by: NURSE PRACTITIONER

## 2017-01-01 PROCEDURE — 87086 URINE CULTURE/COLONY COUNT: CPT | Performed by: INTERNAL MEDICINE

## 2017-01-01 PROCEDURE — 87086 URINE CULTURE/COLONY COUNT: CPT | Performed by: NURSE PRACTITIONER

## 2017-01-01 PROCEDURE — 85025 COMPLETE CBC W/AUTO DIFF WBC: CPT | Performed by: INTERNAL MEDICINE

## 2017-01-01 PROCEDURE — 93306 TTE W/DOPPLER COMPLETE: CPT | Performed by: INTERNAL MEDICINE

## 2017-01-01 PROCEDURE — 97163 PT EVAL HIGH COMPLEX 45 MIN: CPT

## 2017-01-01 PROCEDURE — G8978 MOBILITY CURRENT STATUS: HCPCS

## 2017-01-01 PROCEDURE — 83880 ASSAY OF NATRIURETIC PEPTIDE: CPT | Performed by: EMERGENCY MEDICINE

## 2017-01-01 PROCEDURE — 87106 FUNGI IDENTIFICATION YEAST: CPT | Performed by: INTERNAL MEDICINE

## 2017-01-01 PROCEDURE — 96374 THER/PROPH/DIAG INJ IV PUSH: CPT

## 2017-01-01 PROCEDURE — 84484 ASSAY OF TROPONIN QUANT: CPT

## 2017-01-01 PROCEDURE — 73590 X-RAY EXAM OF LOWER LEG: CPT

## 2017-01-01 PROCEDURE — 80202 ASSAY OF VANCOMYCIN: CPT

## 2017-01-01 PROCEDURE — 87205 SMEAR GRAM STAIN: CPT | Performed by: NURSE PRACTITIONER

## 2017-01-01 PROCEDURE — 82330 ASSAY OF CALCIUM: CPT | Performed by: NURSE PRACTITIONER

## 2017-01-01 PROCEDURE — 25010000002 VANCOMYCIN PER 500 MG

## 2017-01-01 PROCEDURE — 25010000002 VANCOMYCIN: Performed by: NURSE PRACTITIONER

## 2017-01-01 PROCEDURE — 87077 CULTURE AEROBIC IDENTIFY: CPT | Performed by: EMERGENCY MEDICINE

## 2017-01-01 PROCEDURE — 81001 URINALYSIS AUTO W/SCOPE: CPT | Performed by: INTERNAL MEDICINE

## 2017-01-01 PROCEDURE — 93306 TTE W/DOPPLER COMPLETE: CPT

## 2017-01-01 PROCEDURE — 36600 WITHDRAWAL OF ARTERIAL BLOOD: CPT | Performed by: NURSE PRACTITIONER

## 2017-01-01 RX ORDER — SODIUM CHLORIDE 0.9 % (FLUSH) 0.9 %
10 SYRINGE (ML) INJECTION AS NEEDED
Status: DISCONTINUED | OUTPATIENT
Start: 2017-01-01 | End: 2017-01-01 | Stop reason: HOSPADM

## 2017-01-01 RX ORDER — POTASSIUM CHLORIDE 1.5 G/1.77G
40 POWDER, FOR SOLUTION ORAL AS NEEDED
Status: DISCONTINUED | OUTPATIENT
Start: 2017-01-01 | End: 2017-01-01 | Stop reason: HOSPADM

## 2017-01-01 RX ORDER — PREDNISONE 20 MG/1
40 TABLET ORAL DAILY
Status: DISCONTINUED | OUTPATIENT
Start: 2017-01-01 | End: 2017-01-01 | Stop reason: HOSPADM

## 2017-01-01 RX ORDER — IPRATROPIUM BROMIDE AND ALBUTEROL SULFATE 2.5; .5 MG/3ML; MG/3ML
3 SOLUTION RESPIRATORY (INHALATION) ONCE
Status: DISCONTINUED | OUTPATIENT
Start: 2017-01-01 | End: 2017-01-01 | Stop reason: HOSPADM

## 2017-01-01 RX ORDER — GUAIFENESIN 600 MG/1
600 TABLET, EXTENDED RELEASE ORAL EVERY 12 HOURS SCHEDULED
Status: DISCONTINUED | OUTPATIENT
Start: 2017-01-01 | End: 2017-01-01 | Stop reason: HOSPADM

## 2017-01-01 RX ORDER — MONTELUKAST SODIUM 10 MG/1
10 TABLET ORAL NIGHTLY
Status: DISCONTINUED | OUTPATIENT
Start: 2017-01-01 | End: 2017-01-01

## 2017-01-01 RX ORDER — METHYLPREDNISOLONE SODIUM SUCCINATE 125 MG/2ML
30 INJECTION, POWDER, LYOPHILIZED, FOR SOLUTION INTRAMUSCULAR; INTRAVENOUS EVERY 8 HOURS
Status: DISCONTINUED | OUTPATIENT
Start: 2017-01-01 | End: 2017-01-01 | Stop reason: HOSPADM

## 2017-01-01 RX ORDER — IPRATROPIUM BROMIDE AND ALBUTEROL SULFATE 2.5; .5 MG/3ML; MG/3ML
3 SOLUTION RESPIRATORY (INHALATION) ONCE
Status: COMPLETED | OUTPATIENT
Start: 2017-01-01 | End: 2017-01-01

## 2017-01-01 RX ORDER — FUROSEMIDE 40 MG/1
40 TABLET ORAL DAILY
Status: DISCONTINUED | OUTPATIENT
Start: 2017-01-01 | End: 2017-01-01 | Stop reason: HOSPADM

## 2017-01-01 RX ORDER — FLUTICASONE PROPIONATE 50 MCG
2 SPRAY, SUSPENSION (ML) NASAL DAILY
COMMUNITY

## 2017-01-01 RX ORDER — LORAZEPAM 2 MG/ML
1 INJECTION INTRAMUSCULAR
Status: DISCONTINUED | OUTPATIENT
Start: 2017-01-01 | End: 2017-01-01 | Stop reason: HOSPADM

## 2017-01-01 RX ORDER — IPRATROPIUM BROMIDE AND ALBUTEROL SULFATE 2.5; .5 MG/3ML; MG/3ML
3 SOLUTION RESPIRATORY (INHALATION)
Status: DISCONTINUED | OUTPATIENT
Start: 2017-01-01 | End: 2017-01-01 | Stop reason: HOSPADM

## 2017-01-01 RX ORDER — ACETAMINOPHEN 325 MG/1
650 TABLET ORAL EVERY 4 HOURS PRN
Status: DISCONTINUED | OUTPATIENT
Start: 2017-01-01 | End: 2017-01-01

## 2017-01-01 RX ORDER — POTASSIUM CHLORIDE 7.45 MG/ML
10 INJECTION INTRAVENOUS
Status: DISCONTINUED | OUTPATIENT
Start: 2017-01-01 | End: 2017-01-01 | Stop reason: HOSPADM

## 2017-01-01 RX ORDER — LINEZOLID 600 MG/1
600 TABLET, FILM COATED ORAL 2 TIMES DAILY
COMMUNITY

## 2017-01-01 RX ORDER — IVERMECTIN 3 MG/1
3 TABLET ORAL ONCE
Status: COMPLETED | OUTPATIENT
Start: 2017-01-01 | End: 2017-01-01

## 2017-01-01 RX ORDER — MAGNESIUM SULFATE HEPTAHYDRATE 40 MG/ML
2 INJECTION, SOLUTION INTRAVENOUS AS NEEDED
Status: DISCONTINUED | OUTPATIENT
Start: 2017-01-01 | End: 2017-01-01 | Stop reason: HOSPADM

## 2017-01-01 RX ORDER — BUDESONIDE 0.5 MG/2ML
0.5 INHALANT ORAL
Status: DISCONTINUED | OUTPATIENT
Start: 2017-01-01 | End: 2017-01-01 | Stop reason: HOSPADM

## 2017-01-01 RX ORDER — CETIRIZINE HYDROCHLORIDE 10 MG/1
10 TABLET ORAL DAILY
COMMUNITY

## 2017-01-01 RX ORDER — ONDANSETRON 4 MG/1
4 TABLET, FILM COATED ORAL EVERY 8 HOURS PRN
Status: DISCONTINUED | OUTPATIENT
Start: 2017-01-01 | End: 2017-01-01

## 2017-01-01 RX ORDER — POTASSIUM CHLORIDE 1.5 G/1.77G
20 POWDER, FOR SOLUTION ORAL ONCE
Status: COMPLETED | OUTPATIENT
Start: 2017-01-01 | End: 2017-01-01

## 2017-01-01 RX ORDER — DOXYCYCLINE HYCLATE 100 MG/1
100 CAPSULE ORAL EVERY 12 HOURS SCHEDULED
Status: DISCONTINUED | OUTPATIENT
Start: 2017-01-01 | End: 2017-01-01 | Stop reason: HOSPADM

## 2017-01-01 RX ORDER — SACCHAROMYCES BOULARDII 250 MG
250 CAPSULE ORAL 2 TIMES DAILY
Status: DISCONTINUED | OUTPATIENT
Start: 2017-01-01 | End: 2017-01-01

## 2017-01-01 RX ORDER — LEVOCETIRIZINE DIHYDROCHLORIDE 5 MG/1
5 TABLET, FILM COATED ORAL EVERY EVENING
COMMUNITY
End: 2017-01-01 | Stop reason: HOSPADM

## 2017-01-01 RX ORDER — AMITRIPTYLINE HYDROCHLORIDE 50 MG/1
50 TABLET, FILM COATED ORAL NIGHTLY
COMMUNITY

## 2017-01-01 RX ORDER — ROFLUMILAST 500 UG/1
500 TABLET ORAL DAILY
Status: DISCONTINUED | OUTPATIENT
Start: 2017-01-01 | End: 2017-01-01 | Stop reason: HOSPADM

## 2017-01-01 RX ORDER — FUROSEMIDE 10 MG/ML
20 INJECTION INTRAMUSCULAR; INTRAVENOUS ONCE
Status: COMPLETED | OUTPATIENT
Start: 2017-01-01 | End: 2017-01-01

## 2017-01-01 RX ORDER — LEVOTHYROXINE SODIUM 0.03 MG/1
25 TABLET ORAL DAILY
Status: DISCONTINUED | OUTPATIENT
Start: 2017-01-01 | End: 2017-01-01

## 2017-01-01 RX ORDER — BUDESONIDE AND FORMOTEROL FUMARATE DIHYDRATE 160; 4.5 UG/1; UG/1
2 AEROSOL RESPIRATORY (INHALATION)
Status: DISCONTINUED | OUTPATIENT
Start: 2017-01-01 | End: 2017-01-01 | Stop reason: CLARIF

## 2017-01-01 RX ORDER — HEPARIN SODIUM 5000 [USP'U]/ML
5000 INJECTION, SOLUTION INTRAVENOUS; SUBCUTANEOUS EVERY 12 HOURS SCHEDULED
Status: DISCONTINUED | OUTPATIENT
Start: 2017-01-01 | End: 2017-01-01

## 2017-01-01 RX ORDER — FLUTICASONE PROPIONATE 50 MCG
2 SPRAY, SUSPENSION (ML) NASAL DAILY
Status: DISCONTINUED | OUTPATIENT
Start: 2017-01-01 | End: 2017-01-01 | Stop reason: HOSPADM

## 2017-01-01 RX ORDER — ACETAMINOPHEN 325 MG/1
650 TABLET ORAL ONCE
Status: COMPLETED | OUTPATIENT
Start: 2017-01-01 | End: 2017-01-01

## 2017-01-01 RX ORDER — ASCORBIC ACID 500 MG
500 TABLET ORAL 2 TIMES DAILY
COMMUNITY

## 2017-01-01 RX ORDER — METHYLPREDNISOLONE SODIUM SUCCINATE 125 MG/2ML
125 INJECTION, POWDER, LYOPHILIZED, FOR SOLUTION INTRAMUSCULAR; INTRAVENOUS ONCE
Status: COMPLETED | OUTPATIENT
Start: 2017-01-01 | End: 2017-01-01

## 2017-01-01 RX ORDER — DILTIAZEM HCL IN NACL,ISO-OSM 125 MG/125
5-15 PLASTIC BAG, INJECTION (ML) INTRAVENOUS
Status: DISCONTINUED | OUTPATIENT
Start: 2017-01-01 | End: 2017-01-01

## 2017-01-01 RX ORDER — POTASSIUM CHLORIDE 750 MG/1
10 CAPSULE, EXTENDED RELEASE ORAL DAILY
Status: DISCONTINUED | OUTPATIENT
Start: 2017-01-01 | End: 2017-01-01 | Stop reason: HOSPADM

## 2017-01-01 RX ORDER — DIPHENHYDRAMINE HYDROCHLORIDE 50 MG/ML
12.5 INJECTION INTRAMUSCULAR; INTRAVENOUS ONCE
Status: COMPLETED | OUTPATIENT
Start: 2017-01-01 | End: 2017-01-01

## 2017-01-01 RX ORDER — CETIRIZINE HYDROCHLORIDE 10 MG/1
10 TABLET ORAL DAILY
Status: DISCONTINUED | OUTPATIENT
Start: 2017-01-01 | End: 2017-01-01

## 2017-01-01 RX ORDER — ASPIRIN 81 MG/1
81 TABLET, CHEWABLE ORAL DAILY
Status: DISCONTINUED | OUTPATIENT
Start: 2017-01-01 | End: 2017-01-01

## 2017-01-01 RX ORDER — GLYCOPYRROLATE 0.2 MG/ML
0.2 INJECTION INTRAMUSCULAR; INTRAVENOUS
Status: DISCONTINUED | OUTPATIENT
Start: 2017-01-01 | End: 2017-01-01 | Stop reason: HOSPADM

## 2017-01-01 RX ORDER — BUDESONIDE AND FORMOTEROL FUMARATE DIHYDRATE 160; 4.5 UG/1; UG/1
2 AEROSOL RESPIRATORY (INHALATION)
Status: DISCONTINUED | OUTPATIENT
Start: 2017-01-01 | End: 2017-01-01 | Stop reason: HOSPADM

## 2017-01-01 RX ORDER — GABAPENTIN 300 MG/1
600 CAPSULE ORAL EVERY 12 HOURS SCHEDULED
Start: 2017-01-01

## 2017-01-01 RX ORDER — METHYLPREDNISOLONE SODIUM SUCCINATE 125 MG/2ML
60 INJECTION, POWDER, LYOPHILIZED, FOR SOLUTION INTRAMUSCULAR; INTRAVENOUS EVERY 12 HOURS
Status: DISCONTINUED | OUTPATIENT
Start: 2017-01-01 | End: 2017-01-01

## 2017-01-01 RX ORDER — IPRATROPIUM BROMIDE AND ALBUTEROL SULFATE 2.5; .5 MG/3ML; MG/3ML
3 SOLUTION RESPIRATORY (INHALATION) EVERY 4 HOURS PRN
COMMUNITY

## 2017-01-01 RX ORDER — MAGNESIUM SULFATE HEPTAHYDRATE 40 MG/ML
4 INJECTION, SOLUTION INTRAVENOUS AS NEEDED
Status: DISCONTINUED | OUTPATIENT
Start: 2017-01-01 | End: 2017-01-01 | Stop reason: HOSPADM

## 2017-01-01 RX ORDER — VANCOMYCIN HYDROCHLORIDE
20 ONCE
Status: COMPLETED | OUTPATIENT
Start: 2017-01-01 | End: 2017-01-01

## 2017-01-01 RX ORDER — POLYETHYLENE GLYCOL 3350 17 G/17G
17 POWDER, FOR SOLUTION ORAL DAILY
Status: DISCONTINUED | OUTPATIENT
Start: 2017-01-01 | End: 2017-01-01 | Stop reason: HOSPADM

## 2017-01-01 RX ORDER — GUAIFENESIN 600 MG/1
600 TABLET, EXTENDED RELEASE ORAL EVERY 12 HOURS SCHEDULED
Status: DISCONTINUED | OUTPATIENT
Start: 2017-01-01 | End: 2017-01-01

## 2017-01-01 RX ORDER — NICOTINE POLACRILEX 4 MG
15 LOZENGE BUCCAL
Status: DISCONTINUED | OUTPATIENT
Start: 2017-01-01 | End: 2017-01-01 | Stop reason: HOSPADM

## 2017-01-01 RX ORDER — FLUTICASONE PROPIONATE 50 MCG
2 SPRAY, SUSPENSION (ML) NASAL DAILY
Status: DISCONTINUED | OUTPATIENT
Start: 2017-01-01 | End: 2017-01-01

## 2017-01-01 RX ORDER — IPRATROPIUM BROMIDE AND ALBUTEROL SULFATE 2.5; .5 MG/3ML; MG/3ML
3 SOLUTION RESPIRATORY (INHALATION) EVERY 4 HOURS PRN
Status: DISCONTINUED | OUTPATIENT
Start: 2017-01-01 | End: 2017-01-01 | Stop reason: HOSPADM

## 2017-01-01 RX ORDER — ASPIRIN 81 MG/1
81 TABLET, CHEWABLE ORAL DAILY
Status: DISCONTINUED | OUTPATIENT
Start: 2017-01-01 | End: 2017-01-01 | Stop reason: HOSPADM

## 2017-01-01 RX ORDER — POTASSIUM CHLORIDE 750 MG/1
40 CAPSULE, EXTENDED RELEASE ORAL AS NEEDED
Status: DISCONTINUED | OUTPATIENT
Start: 2017-01-01 | End: 2017-01-01 | Stop reason: HOSPADM

## 2017-01-01 RX ORDER — HEPARIN SODIUM 5000 [USP'U]/ML
5000 INJECTION, SOLUTION INTRAVENOUS; SUBCUTANEOUS EVERY 12 HOURS SCHEDULED
Status: DISCONTINUED | OUTPATIENT
Start: 2017-01-01 | End: 2017-01-01 | Stop reason: HOSPADM

## 2017-01-01 RX ORDER — ONDANSETRON 8 MG/1
8 TABLET, ORALLY DISINTEGRATING ORAL EVERY 8 HOURS PRN
COMMUNITY

## 2017-01-01 RX ORDER — SODIUM CHLORIDE 0.9 % (FLUSH) 0.9 %
1-10 SYRINGE (ML) INJECTION AS NEEDED
Status: DISCONTINUED | OUTPATIENT
Start: 2017-01-01 | End: 2017-01-01 | Stop reason: HOSPADM

## 2017-01-01 RX ORDER — PREDNISONE 10 MG/1
5 TABLET ORAL 2 TIMES DAILY
COMMUNITY

## 2017-01-01 RX ORDER — HYDROXYZINE HYDROCHLORIDE 25 MG/1
25 TABLET, FILM COATED ORAL EVERY 8 HOURS PRN
Status: DISCONTINUED | OUTPATIENT
Start: 2017-01-01 | End: 2017-01-01 | Stop reason: HOSPADM

## 2017-01-01 RX ORDER — ISOSORBIDE MONONITRATE 30 MG/1
30 TABLET, EXTENDED RELEASE ORAL
Status: DISCONTINUED | OUTPATIENT
Start: 2017-01-01 | End: 2017-01-01 | Stop reason: HOSPADM

## 2017-01-01 RX ORDER — DIPHENHYDRAMINE HYDROCHLORIDE 50 MG/ML
12.5 INJECTION INTRAMUSCULAR; INTRAVENOUS EVERY 6 HOURS PRN
Status: DISCONTINUED | OUTPATIENT
Start: 2017-01-01 | End: 2017-01-01 | Stop reason: HOSPADM

## 2017-01-01 RX ORDER — MONTELUKAST SODIUM 10 MG/1
10 TABLET ORAL DAILY
Status: DISCONTINUED | OUTPATIENT
Start: 2017-01-01 | End: 2017-01-01 | Stop reason: HOSPADM

## 2017-01-01 RX ORDER — POTASSIUM CHLORIDE 750 MG/1
20 CAPSULE, EXTENDED RELEASE ORAL ONCE
Status: COMPLETED | OUTPATIENT
Start: 2017-01-01 | End: 2017-01-01

## 2017-01-01 RX ORDER — FAMOTIDINE 20 MG/1
20 TABLET, FILM COATED ORAL 2 TIMES DAILY
Status: DISCONTINUED | OUTPATIENT
Start: 2017-01-01 | End: 2017-01-01

## 2017-01-01 RX ORDER — POLYETHYLENE GLYCOL 3350 17 G/17G
17 POWDER, FOR SOLUTION ORAL DAILY PRN
Status: DISCONTINUED | OUTPATIENT
Start: 2017-01-01 | End: 2017-01-01

## 2017-01-01 RX ORDER — SACCHAROMYCES BOULARDII 250 MG
250 CAPSULE ORAL 2 TIMES DAILY
COMMUNITY

## 2017-01-01 RX ORDER — FAMOTIDINE 20 MG/1
20 TABLET, FILM COATED ORAL DAILY
Status: DISCONTINUED | OUTPATIENT
Start: 2017-01-01 | End: 2017-01-01 | Stop reason: HOSPADM

## 2017-01-01 RX ORDER — GABAPENTIN 300 MG/1
600 CAPSULE ORAL EVERY 12 HOURS SCHEDULED
Status: DISCONTINUED | OUTPATIENT
Start: 2017-01-01 | End: 2017-01-01 | Stop reason: HOSPADM

## 2017-01-01 RX ORDER — DEXTROSE MONOHYDRATE 25 G/50ML
25 INJECTION, SOLUTION INTRAVENOUS
Status: DISCONTINUED | OUTPATIENT
Start: 2017-01-01 | End: 2017-01-01 | Stop reason: HOSPADM

## 2017-01-01 RX ORDER — SOTALOL HYDROCHLORIDE 80 MG/1
40 TABLET ORAL EVERY 12 HOURS SCHEDULED
Status: DISCONTINUED | OUTPATIENT
Start: 2017-01-01 | End: 2017-01-01 | Stop reason: HOSPADM

## 2017-01-01 RX ORDER — DIPHENHYDRAMINE HYDROCHLORIDE, ZINC ACETATE 2; .1 G/100G; G/100G
CREAM TOPICAL DAILY PRN
Status: DISCONTINUED | OUTPATIENT
Start: 2017-01-01 | End: 2017-01-01 | Stop reason: HOSPADM

## 2017-01-01 RX ORDER — DOCUSATE SODIUM 100 MG/1
100 CAPSULE, LIQUID FILLED ORAL 2 TIMES DAILY PRN
Status: DISCONTINUED | OUTPATIENT
Start: 2017-01-01 | End: 2017-01-01

## 2017-01-01 RX ORDER — LEVOTHYROXINE SODIUM 0.03 MG/1
25 TABLET ORAL
Status: DISCONTINUED | OUTPATIENT
Start: 2017-01-01 | End: 2017-01-01 | Stop reason: HOSPADM

## 2017-01-01 RX ORDER — MAGNESIUM SULFATE 1 G/100ML
1 INJECTION INTRAVENOUS AS NEEDED
Status: DISCONTINUED | OUTPATIENT
Start: 2017-01-01 | End: 2017-01-01 | Stop reason: HOSPADM

## 2017-01-01 RX ORDER — SOTALOL HYDROCHLORIDE 80 MG/1
40 TABLET ORAL EVERY 12 HOURS SCHEDULED
Status: DISCONTINUED | OUTPATIENT
Start: 2017-01-01 | End: 2017-01-01

## 2017-01-01 RX ORDER — SODIUM CHLORIDE 0.9 % (FLUSH) 0.9 %
10-20 SYRINGE (ML) INJECTION AS NEEDED
Status: DISCONTINUED | OUTPATIENT
Start: 2017-01-01 | End: 2017-01-01 | Stop reason: HOSPADM

## 2017-01-01 RX ORDER — SOTALOL HYDROCHLORIDE 80 MG/1
40 TABLET ORAL EVERY 12 HOURS SCHEDULED
Start: 2017-01-01

## 2017-01-01 RX ORDER — MULTIPLE VITAMINS W/ MINERALS TAB 9MG-400MCG
1 TAB ORAL DAILY
Status: DISCONTINUED | OUTPATIENT
Start: 2017-01-01 | End: 2017-01-01

## 2017-01-01 RX ORDER — DIPHENHYDRAMINE HYDROCHLORIDE, ZINC ACETATE 2; .1 G/100G; G/100G
CREAM TOPICAL DAILY PRN
COMMUNITY

## 2017-01-01 RX ORDER — ASCORBIC ACID 500 MG
500 TABLET ORAL 2 TIMES DAILY
Status: DISCONTINUED | OUTPATIENT
Start: 2017-01-01 | End: 2017-01-01

## 2017-01-01 RX ORDER — DEXMEDETOMIDINE HYDROCHLORIDE 4 UG/ML
INJECTION, SOLUTION INTRAVENOUS
Status: COMPLETED
Start: 2017-01-01 | End: 2017-01-01

## 2017-01-01 RX ORDER — FERROUS SULFATE 325(65) MG
325 TABLET ORAL DAILY
Status: DISCONTINUED | OUTPATIENT
Start: 2017-01-01 | End: 2017-01-01 | Stop reason: HOSPADM

## 2017-01-01 RX ORDER — DOXYCYCLINE HYCLATE 100 MG/1
100 CAPSULE ORAL EVERY 12 HOURS SCHEDULED
Qty: 6 CAPSULE | Refills: 0
Start: 2017-01-01 | End: 2017-01-01

## 2017-01-01 RX ORDER — AMITRIPTYLINE HYDROCHLORIDE 50 MG/1
50 TABLET, FILM COATED ORAL NIGHTLY
Status: DISCONTINUED | OUTPATIENT
Start: 2017-01-01 | End: 2017-01-01

## 2017-01-01 RX ORDER — AZELASTINE 1 MG/ML
2 SPRAY, METERED NASAL 2 TIMES DAILY
Status: DISCONTINUED | OUTPATIENT
Start: 2017-01-01 | End: 2017-01-01

## 2017-01-01 RX ORDER — PERMETHRIN 50 MG/G
CREAM TOPICAL ONCE
Status: COMPLETED | OUTPATIENT
Start: 2017-01-01 | End: 2017-01-01

## 2017-01-01 RX ORDER — AMITRIPTYLINE HYDROCHLORIDE 50 MG/1
50 TABLET, FILM COATED ORAL NIGHTLY
Status: DISCONTINUED | OUTPATIENT
Start: 2017-01-01 | End: 2017-01-01 | Stop reason: HOSPADM

## 2017-01-01 RX ORDER — MORPHINE SULFATE 2 MG/ML
2 INJECTION, SOLUTION INTRAMUSCULAR; INTRAVENOUS
Status: DISCONTINUED | OUTPATIENT
Start: 2017-01-01 | End: 2017-01-01 | Stop reason: HOSPADM

## 2017-01-01 RX ORDER — ARFORMOTEROL TARTRATE 15 UG/2ML
15 SOLUTION RESPIRATORY (INHALATION)
Status: DISCONTINUED | OUTPATIENT
Start: 2017-01-01 | End: 2017-01-01 | Stop reason: HOSPADM

## 2017-01-01 RX ORDER — ONDANSETRON 2 MG/ML
4 INJECTION INTRAMUSCULAR; INTRAVENOUS EVERY 6 HOURS PRN
Status: DISCONTINUED | OUTPATIENT
Start: 2017-01-01 | End: 2017-01-01 | Stop reason: HOSPADM

## 2017-01-01 RX ORDER — MULTIPLE VITAMINS W/ MINERALS TAB 9MG-400MCG
1 TAB ORAL DAILY
Status: DISCONTINUED | OUTPATIENT
Start: 2017-01-01 | End: 2017-01-01 | Stop reason: HOSPADM

## 2017-01-01 RX ORDER — ROFLUMILAST 500 UG/1
500 TABLET ORAL DAILY
Status: DISCONTINUED | OUTPATIENT
Start: 2017-01-01 | End: 2017-01-01

## 2017-01-01 RX ORDER — FERROUS SULFATE 325(65) MG
325 TABLET ORAL DAILY
Status: DISCONTINUED | OUTPATIENT
Start: 2017-01-01 | End: 2017-01-01

## 2017-01-01 RX ORDER — DEXMEDETOMIDINE HYDROCHLORIDE 4 UG/ML
.2-1.5 INJECTION, SOLUTION INTRAVENOUS
Status: DISCONTINUED | OUTPATIENT
Start: 2017-01-01 | End: 2017-01-01 | Stop reason: HOSPADM

## 2017-01-01 RX ORDER — ATORVASTATIN CALCIUM 10 MG/1
5 TABLET, FILM COATED ORAL NIGHTLY
Status: DISCONTINUED | OUTPATIENT
Start: 2017-01-01 | End: 2017-01-01

## 2017-01-01 RX ORDER — AZELASTINE 1 MG/ML
2 SPRAY, METERED NASAL 2 TIMES DAILY
COMMUNITY

## 2017-01-01 RX ORDER — ARFORMOTEROL TARTRATE 15 UG/2ML
15 SOLUTION RESPIRATORY (INHALATION)
Status: DISCONTINUED | OUTPATIENT
Start: 2017-01-01 | End: 2017-01-01 | Stop reason: SDUPTHER

## 2017-01-01 RX ADMIN — METHYLPREDNISOLONE SODIUM SUCCINATE 125 MG: 125 INJECTION, POWDER, FOR SOLUTION INTRAMUSCULAR; INTRAVENOUS at 01:12

## 2017-01-01 RX ADMIN — IPRATROPIUM BROMIDE AND ALBUTEROL SULFATE 3 ML: .5; 3 SOLUTION RESPIRATORY (INHALATION) at 21:30

## 2017-01-01 RX ADMIN — POLYETHYLENE GLYCOL 3350 17 G: 17 POWDER, FOR SOLUTION ORAL at 12:52

## 2017-01-01 RX ADMIN — ROFLUMILAST 500 MCG: 500 TABLET ORAL at 08:51

## 2017-01-01 RX ADMIN — HEPARIN SODIUM 5000 UNITS: 5000 INJECTION, SOLUTION INTRAVENOUS; SUBCUTANEOUS at 21:22

## 2017-01-01 RX ADMIN — IPRATROPIUM BROMIDE AND ALBUTEROL SULFATE 3 ML: .5; 3 SOLUTION RESPIRATORY (INHALATION) at 13:02

## 2017-01-01 RX ADMIN — IPRATROPIUM BROMIDE AND ALBUTEROL SULFATE 3 ML: .5; 3 SOLUTION RESPIRATORY (INHALATION) at 01:08

## 2017-01-01 RX ADMIN — GUAIFENESIN 600 MG: 600 TABLET, EXTENDED RELEASE ORAL at 12:33

## 2017-01-01 RX ADMIN — MONTELUKAST SODIUM 10 MG: 10 TABLET, FILM COATED ORAL at 10:00

## 2017-01-01 RX ADMIN — Medication 325 MG: at 10:33

## 2017-01-01 RX ADMIN — METHYLPREDNISOLONE SODIUM SUCCINATE 30 MG: 125 INJECTION, POWDER, FOR SOLUTION INTRAMUSCULAR; INTRAVENOUS at 21:23

## 2017-01-01 RX ADMIN — IPRATROPIUM BROMIDE AND ALBUTEROL SULFATE 3 ML: .5; 3 SOLUTION RESPIRATORY (INHALATION) at 06:52

## 2017-01-01 RX ADMIN — Medication 250 MG: at 12:33

## 2017-01-01 RX ADMIN — GUAIFENESIN 600 MG: 600 TABLET, EXTENDED RELEASE ORAL at 09:03

## 2017-01-01 RX ADMIN — BUDESONIDE AND FORMOTEROL FUMARATE DIHYDRATE 2 PUFF: 160; 4.5 AEROSOL RESPIRATORY (INHALATION) at 08:33

## 2017-01-01 RX ADMIN — PREDNISONE 40 MG: 20 TABLET ORAL at 10:31

## 2017-01-01 RX ADMIN — Medication 5 MG/HR: at 01:14

## 2017-01-01 RX ADMIN — DEXMEDETOMIDINE HYDROCHLORIDE 0.4 MCG/KG/HR: 4 INJECTION, SOLUTION INTRAVENOUS at 15:58

## 2017-01-01 RX ADMIN — HEPARIN SODIUM 5000 UNITS: 5000 INJECTION, SOLUTION INTRAVENOUS; SUBCUTANEOUS at 20:28

## 2017-01-01 RX ADMIN — FAMOTIDINE 20 MG: 20 TABLET ORAL at 12:34

## 2017-01-01 RX ADMIN — FUROSEMIDE 40 MG: 40 TABLET ORAL at 14:17

## 2017-01-01 RX ADMIN — BUDESONIDE AND FORMOTEROL FUMARATE DIHYDRATE 2 PUFF: 160; 4.5 AEROSOL RESPIRATORY (INHALATION) at 06:52

## 2017-01-01 RX ADMIN — POTASSIUM CHLORIDE 10 MEQ: 750 CAPSULE, EXTENDED RELEASE ORAL at 14:16

## 2017-01-01 RX ADMIN — BUDESONIDE AND FORMOTEROL FUMARATE DIHYDRATE 2 PUFF: 160; 4.5 AEROSOL RESPIRATORY (INHALATION) at 07:59

## 2017-01-01 RX ADMIN — DOXYCYCLINE HYCLATE 100 MG: 100 CAPSULE ORAL at 09:58

## 2017-01-01 RX ADMIN — HEPARIN SODIUM 5000 UNITS: 5000 INJECTION, SOLUTION INTRAVENOUS; SUBCUTANEOUS at 10:01

## 2017-01-01 RX ADMIN — HEPARIN SODIUM 5000 UNITS: 5000 INJECTION, SOLUTION INTRAVENOUS; SUBCUTANEOUS at 20:08

## 2017-01-01 RX ADMIN — OXYCODONE HYDROCHLORIDE AND ACETAMINOPHEN 500 MG: 500 TABLET ORAL at 08:52

## 2017-01-01 RX ADMIN — LORAZEPAM 1 MG: 2 INJECTION, SOLUTION INTRAMUSCULAR; INTRAVENOUS at 11:55

## 2017-01-01 RX ADMIN — HEPARIN SODIUM 5000 UNITS: 5000 INJECTION, SOLUTION INTRAVENOUS; SUBCUTANEOUS at 09:42

## 2017-01-01 RX ADMIN — METHYLPREDNISOLONE SODIUM SUCCINATE 60 MG: 125 INJECTION, POWDER, FOR SOLUTION INTRAMUSCULAR; INTRAVENOUS at 06:20

## 2017-01-01 RX ADMIN — GUAIFENESIN 600 MG: 600 TABLET, EXTENDED RELEASE ORAL at 10:27

## 2017-01-01 RX ADMIN — MULTIPLE VITAMINS W/ MINERALS TAB 1 TABLET: TAB ORAL at 09:04

## 2017-01-01 RX ADMIN — IPRATROPIUM BROMIDE AND ALBUTEROL SULFATE 3 ML: .5; 3 SOLUTION RESPIRATORY (INHALATION) at 19:59

## 2017-01-01 RX ADMIN — IPRATROPIUM BROMIDE AND ALBUTEROL SULFATE 3 ML: .5; 3 SOLUTION RESPIRATORY (INHALATION) at 07:58

## 2017-01-01 RX ADMIN — NYSTATIN AND TRIAMCINOLONE ACETONIDE 1 APPLICATION: 100000; 1 CREAM TOPICAL at 21:03

## 2017-01-01 RX ADMIN — GLYCOPYRROLATE 0.2 MG: 0.2 INJECTION, SOLUTION INTRAMUSCULAR; INTRAVENOUS at 15:40

## 2017-01-01 RX ADMIN — Medication 250 MG: at 08:52

## 2017-01-01 RX ADMIN — GUAIFENESIN 600 MG: 600 TABLET, EXTENDED RELEASE ORAL at 21:23

## 2017-01-01 RX ADMIN — DIPHENHYDRAMINE HYDROCHLORIDE 12.5 MG: 50 INJECTION INTRAMUSCULAR; INTRAVENOUS at 13:23

## 2017-01-01 RX ADMIN — AZTREONAM 2 G: 2 INJECTION, POWDER, LYOPHILIZED, FOR SOLUTION INTRAMUSCULAR; INTRAVENOUS at 02:34

## 2017-01-01 RX ADMIN — MONTELUKAST SODIUM 10 MG: 10 TABLET, FILM COATED ORAL at 14:17

## 2017-01-01 RX ADMIN — POTASSIUM CHLORIDE 40 MEQ: 750 CAPSULE, EXTENDED RELEASE ORAL at 18:53

## 2017-01-01 RX ADMIN — DOXYCYCLINE HYCLATE 100 MG: 100 CAPSULE ORAL at 18:35

## 2017-01-01 RX ADMIN — GUAIFENESIN 600 MG: 600 TABLET, EXTENDED RELEASE ORAL at 20:28

## 2017-01-01 RX ADMIN — METHYLPREDNISOLONE SODIUM SUCCINATE 30 MG: 125 INJECTION, POWDER, FOR SOLUTION INTRAMUSCULAR; INTRAVENOUS at 05:39

## 2017-01-01 RX ADMIN — ASPIRIN 81 MG: 81 TABLET, CHEWABLE ORAL at 09:58

## 2017-01-01 RX ADMIN — LEVOTHYROXINE SODIUM 25 MCG: 25 TABLET ORAL at 06:28

## 2017-01-01 RX ADMIN — MORPHINE SULFATE 2 MG/HR: 50 INJECTION, SOLUTION, CONCENTRATE INTRAVENOUS at 17:11

## 2017-01-01 RX ADMIN — VANCOMYCIN HYDROCHLORIDE 1250 MG: 1 INJECTION, POWDER, LYOPHILIZED, FOR SOLUTION INTRAVENOUS at 13:42

## 2017-01-01 RX ADMIN — METHYLPREDNISOLONE SODIUM SUCCINATE 125 MG: 125 INJECTION, POWDER, FOR SOLUTION INTRAMUSCULAR; INTRAVENOUS at 18:15

## 2017-01-01 RX ADMIN — GABAPENTIN 600 MG: 300 CAPSULE ORAL at 20:07

## 2017-01-01 RX ADMIN — AZTREONAM 2 G: 2 INJECTION, POWDER, LYOPHILIZED, FOR SOLUTION INTRAMUSCULAR; INTRAVENOUS at 02:09

## 2017-01-01 RX ADMIN — ATORVASTATIN CALCIUM 5 MG: 10 TABLET, FILM COATED ORAL at 22:19

## 2017-01-01 RX ADMIN — IPRATROPIUM BROMIDE AND ALBUTEROL SULFATE 3 ML: .5; 3 SOLUTION RESPIRATORY (INHALATION) at 02:19

## 2017-01-01 RX ADMIN — ATORVASTATIN CALCIUM 5 MG: 10 TABLET, FILM COATED ORAL at 21:22

## 2017-01-01 RX ADMIN — IPRATROPIUM BROMIDE AND ALBUTEROL SULFATE 3 ML: .5; 3 SOLUTION RESPIRATORY (INHALATION) at 13:40

## 2017-01-01 RX ADMIN — ASPIRIN 81 MG: 81 TABLET, CHEWABLE ORAL at 14:16

## 2017-01-01 RX ADMIN — PREDNISONE 40 MG: 20 TABLET ORAL at 16:31

## 2017-01-01 RX ADMIN — CETIRIZINE HYDROCHLORIDE 10 MG: 10 TABLET, FILM COATED ORAL at 08:53

## 2017-01-01 RX ADMIN — LEVOTHYROXINE SODIUM 25 MCG: 25 TABLET ORAL at 05:37

## 2017-01-01 RX ADMIN — INSULIN LISPRO 5 UNITS: 100 INJECTION, SOLUTION INTRAVENOUS; SUBCUTANEOUS at 21:24

## 2017-01-01 RX ADMIN — POTASSIUM CHLORIDE 20 MEQ: 750 CAPSULE, EXTENDED RELEASE ORAL at 14:17

## 2017-01-01 RX ADMIN — GUAIFENESIN 600 MG: 600 TABLET, EXTENDED RELEASE ORAL at 22:18

## 2017-01-01 RX ADMIN — ROFLUMILAST 500 MCG: 500 TABLET ORAL at 15:12

## 2017-01-01 RX ADMIN — ROPINIROLE HYDROCHLORIDE 3 MG: 2 TABLET, FILM COATED ORAL at 20:28

## 2017-01-01 RX ADMIN — DIPHENHYDRAMINE HYDROCHLORIDE 12.5 MG: 50 INJECTION INTRAMUSCULAR; INTRAVENOUS at 11:48

## 2017-01-01 RX ADMIN — GLYCOPYRROLATE 0.2 MG: 0.2 INJECTION, SOLUTION INTRAMUSCULAR; INTRAVENOUS at 11:55

## 2017-01-01 RX ADMIN — HEPARIN SODIUM 5000 UNITS: 5000 INJECTION, SOLUTION INTRAVENOUS; SUBCUTANEOUS at 21:25

## 2017-01-01 RX ADMIN — POTASSIUM CHLORIDE 10 MEQ: 750 CAPSULE, EXTENDED RELEASE ORAL at 10:37

## 2017-01-01 RX ADMIN — METHYLPREDNISOLONE SODIUM SUCCINATE 30 MG: 125 INJECTION, POWDER, FOR SOLUTION INTRAMUSCULAR; INTRAVENOUS at 22:00

## 2017-01-01 RX ADMIN — INSULIN LISPRO 2 UNITS: 100 INJECTION, SOLUTION INTRAVENOUS; SUBCUTANEOUS at 13:31

## 2017-01-01 RX ADMIN — ROPINIROLE HYDROCHLORIDE 3.5 MG: 2 TABLET, FILM COATED ORAL at 21:22

## 2017-01-01 RX ADMIN — FERROUS SULFATE TAB 325 MG (65 MG ELEMENTAL FE) 325 MG: 325 (65 FE) TAB at 12:33

## 2017-01-01 RX ADMIN — ASPIRIN 81 MG: 81 TABLET, CHEWABLE ORAL at 09:03

## 2017-01-01 RX ADMIN — MONTELUKAST SODIUM 10 MG: 10 TABLET, FILM COATED ORAL at 09:04

## 2017-01-01 RX ADMIN — POLYETHYLENE GLYCOL 3350 17 G: 17 POWDER, FOR SOLUTION ORAL at 09:57

## 2017-01-01 RX ADMIN — GLYCOPYRROLATE 0.2 MG: 0.2 INJECTION, SOLUTION INTRAMUSCULAR; INTRAVENOUS at 20:21

## 2017-01-01 RX ADMIN — MONTELUKAST SODIUM 10 MG: 10 TABLET, FILM COATED ORAL at 21:22

## 2017-01-01 RX ADMIN — AZTREONAM 2 G: 2 INJECTION, POWDER, LYOPHILIZED, FOR SOLUTION INTRAMUSCULAR; INTRAVENOUS at 13:27

## 2017-01-01 RX ADMIN — DIPHENHYDRAMINE HYDROCHLORIDE 12.5 MG: 50 INJECTION INTRAMUSCULAR; INTRAVENOUS at 21:23

## 2017-01-01 RX ADMIN — FUROSEMIDE 40 MG: 40 TABLET ORAL at 10:34

## 2017-01-01 RX ADMIN — GABAPENTIN 600 MG: 300 CAPSULE ORAL at 09:04

## 2017-01-01 RX ADMIN — MORPHINE SULFATE 2.5 MG/HR: 50 INJECTION, SOLUTION, CONCENTRATE INTRAVENOUS at 05:39

## 2017-01-01 RX ADMIN — ROPINIROLE HYDROCHLORIDE 3.5 MG: 2 TABLET, FILM COATED ORAL at 05:25

## 2017-01-01 RX ADMIN — Medication 325 MG: at 14:16

## 2017-01-01 RX ADMIN — FLUTICASONE PROPIONATE 2 SPRAY: 50 SPRAY, METERED NASAL at 10:38

## 2017-01-01 RX ADMIN — POTASSIUM CHLORIDE 40 MEQ: 750 CAPSULE, EXTENDED RELEASE ORAL at 12:37

## 2017-01-01 RX ADMIN — ROPINIROLE HYDROCHLORIDE 3 MG: 2 TABLET, FILM COATED ORAL at 21:34

## 2017-01-01 RX ADMIN — DOXYCYCLINE 100 MG: 100 INJECTION, POWDER, LYOPHILIZED, FOR SOLUTION INTRAVENOUS at 09:43

## 2017-01-01 RX ADMIN — HEPARIN SODIUM 5000 UNITS: 5000 INJECTION, SOLUTION INTRAVENOUS; SUBCUTANEOUS at 10:36

## 2017-01-01 RX ADMIN — PREDNISONE 40 MG: 20 TABLET ORAL at 09:03

## 2017-01-01 RX ADMIN — GLYCOPYRROLATE 0.2 MG: 0.2 INJECTION, SOLUTION INTRAMUSCULAR; INTRAVENOUS at 00:24

## 2017-01-01 RX ADMIN — METHYLPREDNISOLONE SODIUM SUCCINATE 30 MG: 125 INJECTION, POWDER, FOR SOLUTION INTRAMUSCULAR; INTRAVENOUS at 15:21

## 2017-01-01 RX ADMIN — IPRATROPIUM BROMIDE AND ALBUTEROL SULFATE 3 ML: .5; 3 SOLUTION RESPIRATORY (INHALATION) at 14:01

## 2017-01-01 RX ADMIN — GABAPENTIN 600 MG: 300 CAPSULE ORAL at 21:34

## 2017-01-01 RX ADMIN — AZELASTINE HYDROCHLORIDE 2 SPRAY: 137 SPRAY, METERED NASAL at 08:55

## 2017-01-01 RX ADMIN — ROPINIROLE HYDROCHLORIDE 3 MG: 2 TABLET, FILM COATED ORAL at 09:02

## 2017-01-01 RX ADMIN — METHYLPREDNISOLONE SODIUM SUCCINATE 30 MG: 125 INJECTION, POWDER, FOR SOLUTION INTRAMUSCULAR; INTRAVENOUS at 22:17

## 2017-01-01 RX ADMIN — LEVOTHYROXINE SODIUM 25 MCG: 25 TABLET ORAL at 06:13

## 2017-01-01 RX ADMIN — METHYLPREDNISOLONE SODIUM SUCCINATE 30 MG: 125 INJECTION, POWDER, FOR SOLUTION INTRAMUSCULAR; INTRAVENOUS at 05:37

## 2017-01-01 RX ADMIN — METHYLPREDNISOLONE SODIUM SUCCINATE 30 MG: 125 INJECTION, POWDER, FOR SOLUTION INTRAMUSCULAR; INTRAVENOUS at 06:48

## 2017-01-01 RX ADMIN — ARFORMOTEROL TARTRATE 15 MCG: 15 SOLUTION RESPIRATORY (INHALATION) at 19:44

## 2017-01-01 RX ADMIN — FLUTICASONE PROPIONATE 2 SPRAY: 50 SPRAY, METERED NASAL at 14:16

## 2017-01-01 RX ADMIN — ROFLUMILAST 500 MCG: 500 TABLET ORAL at 09:04

## 2017-01-01 RX ADMIN — LEVOTHYROXINE SODIUM 25 MCG: 25 TABLET ORAL at 05:54

## 2017-01-01 RX ADMIN — HEPARIN SODIUM 5000 UNITS: 5000 INJECTION, SOLUTION INTRAVENOUS; SUBCUTANEOUS at 22:17

## 2017-01-01 RX ADMIN — IVERMECTIN 3 MG: 3 TABLET ORAL at 16:27

## 2017-01-01 RX ADMIN — AMITRIPTYLINE HYDROCHLORIDE 50 MG: 50 TABLET, FILM COATED ORAL at 20:28

## 2017-01-01 RX ADMIN — FAMOTIDINE 20 MG: 20 TABLET ORAL at 08:53

## 2017-01-01 RX ADMIN — AMITRIPTYLINE HYDROCHLORIDE 50 MG: 50 TABLET, FILM COATED ORAL at 20:07

## 2017-01-01 RX ADMIN — VANCOMYCIN HYDROCHLORIDE 1250 MG: 1 INJECTION, POWDER, LYOPHILIZED, FOR SOLUTION INTRAVENOUS at 01:34

## 2017-01-01 RX ADMIN — IPRATROPIUM BROMIDE AND ALBUTEROL SULFATE 3 ML: .5; 3 SOLUTION RESPIRATORY (INHALATION) at 00:23

## 2017-01-01 RX ADMIN — LEVOTHYROXINE SODIUM 25 MCG: 25 TABLET ORAL at 05:25

## 2017-01-01 RX ADMIN — ISOSORBIDE MONONITRATE 30 MG: 30 TABLET, EXTENDED RELEASE ORAL at 09:04

## 2017-01-01 RX ADMIN — DOXYCYCLINE HYCLATE 100 MG: 100 CAPSULE ORAL at 06:13

## 2017-01-01 RX ADMIN — AMITRIPTYLINE HYDROCHLORIDE 50 MG: 50 TABLET, FILM COATED ORAL at 21:22

## 2017-01-01 RX ADMIN — HEPARIN SODIUM 5000 UNITS: 5000 INJECTION, SOLUTION INTRAVENOUS; SUBCUTANEOUS at 12:36

## 2017-01-01 RX ADMIN — ARFORMOTEROL TARTRATE 15 MCG: 15 SOLUTION RESPIRATORY (INHALATION) at 06:52

## 2017-01-01 RX ADMIN — METHYLPREDNISOLONE SODIUM SUCCINATE 30 MG: 125 INJECTION, POWDER, FOR SOLUTION INTRAMUSCULAR; INTRAVENOUS at 21:03

## 2017-01-01 RX ADMIN — AZTREONAM 2 G: 2 INJECTION, POWDER, LYOPHILIZED, FOR SOLUTION INTRAMUSCULAR; INTRAVENOUS at 01:54

## 2017-01-01 RX ADMIN — BUDESONIDE AND FORMOTEROL FUMARATE DIHYDRATE 2 PUFF: 160; 4.5 AEROSOL RESPIRATORY (INHALATION) at 19:43

## 2017-01-01 RX ADMIN — MAGNESIUM SULFATE HEPTAHYDRATE 2 G: 40 INJECTION, SOLUTION INTRAVENOUS at 14:32

## 2017-01-01 RX ADMIN — IPRATROPIUM BROMIDE AND ALBUTEROL SULFATE 3 ML: .5; 3 SOLUTION RESPIRATORY (INHALATION) at 19:43

## 2017-01-01 RX ADMIN — ASPIRIN 81 MG 81 MG: 81 TABLET ORAL at 12:33

## 2017-01-01 RX ADMIN — HEPARIN SODIUM 5000 UNITS: 5000 INJECTION, SOLUTION INTRAVENOUS; SUBCUTANEOUS at 09:04

## 2017-01-01 RX ADMIN — PREDNISONE 40 MG: 20 TABLET ORAL at 10:00

## 2017-01-01 RX ADMIN — MULTIPLE VITAMINS W/ MINERALS TAB 1 TABLET: TAB ORAL at 09:59

## 2017-01-01 RX ADMIN — ROFLUMILAST 500 MCG: 500 TABLET ORAL at 09:58

## 2017-01-01 RX ADMIN — GLYCOPYRROLATE 0.2 MG: 0.2 INJECTION, SOLUTION INTRAMUSCULAR; INTRAVENOUS at 21:03

## 2017-01-01 RX ADMIN — GLYCOPYRROLATE 0.2 MG: 0.2 INJECTION, SOLUTION INTRAMUSCULAR; INTRAVENOUS at 13:14

## 2017-01-01 RX ADMIN — FUROSEMIDE 40 MG: 40 TABLET ORAL at 09:59

## 2017-01-01 RX ADMIN — SOTALOL HYDROCHLORIDE 40 MG: 80 TABLET ORAL at 10:31

## 2017-01-01 RX ADMIN — ROPINIROLE HYDROCHLORIDE 3 MG: 2 TABLET, FILM COATED ORAL at 16:16

## 2017-01-01 RX ADMIN — IPRATROPIUM BROMIDE AND ALBUTEROL SULFATE 3 ML: .5; 3 SOLUTION RESPIRATORY (INHALATION) at 02:16

## 2017-01-01 RX ADMIN — BUDESONIDE AND FORMOTEROL FUMARATE DIHYDRATE 2 PUFF: 160; 4.5 AEROSOL RESPIRATORY (INHALATION) at 07:25

## 2017-01-01 RX ADMIN — LORAZEPAM 1 MG: 2 INJECTION, SOLUTION INTRAMUSCULAR; INTRAVENOUS at 17:02

## 2017-01-01 RX ADMIN — FAMOTIDINE 20 MG: 20 TABLET ORAL at 10:00

## 2017-01-01 RX ADMIN — DOXYCYCLINE HYCLATE 100 MG: 100 CAPSULE ORAL at 18:00

## 2017-01-01 RX ADMIN — BUDESONIDE AND FORMOTEROL FUMARATE DIHYDRATE 2 PUFF: 160; 4.5 AEROSOL RESPIRATORY (INHALATION) at 19:33

## 2017-01-01 RX ADMIN — GUAIFENESIN 600 MG: 600 TABLET, EXTENDED RELEASE ORAL at 10:01

## 2017-01-01 RX ADMIN — AZTREONAM 2 G: 2 INJECTION, POWDER, LYOPHILIZED, FOR SOLUTION INTRAMUSCULAR; INTRAVENOUS at 19:28

## 2017-01-01 RX ADMIN — MONTELUKAST SODIUM 10 MG: 10 TABLET, FILM COATED ORAL at 10:33

## 2017-01-01 RX ADMIN — SOTALOL HYDROCHLORIDE 40 MG: 80 TABLET ORAL at 21:34

## 2017-01-01 RX ADMIN — ASPIRIN 81 MG 81 MG: 81 TABLET ORAL at 08:52

## 2017-01-01 RX ADMIN — LEVOTHYROXINE SODIUM 25 MCG: 25 TABLET ORAL at 14:16

## 2017-01-01 RX ADMIN — ROPINIROLE HYDROCHLORIDE 3 MG: 2 TABLET, FILM COATED ORAL at 10:29

## 2017-01-01 RX ADMIN — IPRATROPIUM BROMIDE AND ALBUTEROL SULFATE 3 ML: .5; 3 SOLUTION RESPIRATORY (INHALATION) at 07:27

## 2017-01-01 RX ADMIN — AMITRIPTYLINE HYDROCHLORIDE 50 MG: 50 TABLET, FILM COATED ORAL at 22:21

## 2017-01-01 RX ADMIN — INSULIN LISPRO 2 UNITS: 100 INJECTION, SOLUTION INTRAVENOUS; SUBCUTANEOUS at 22:22

## 2017-01-01 RX ADMIN — ROPINIROLE HYDROCHLORIDE 3 MG: 2 TABLET, FILM COATED ORAL at 09:58

## 2017-01-01 RX ADMIN — MULTIPLE VITAMINS W/ MINERALS TAB 1 TABLET: TAB ORAL at 14:16

## 2017-01-01 RX ADMIN — ROPINIROLE HYDROCHLORIDE 3 MG: 2 TABLET, FILM COATED ORAL at 17:57

## 2017-01-01 RX ADMIN — BUDESONIDE AND FORMOTEROL FUMARATE DIHYDRATE 2 PUFF: 160; 4.5 AEROSOL RESPIRATORY (INHALATION) at 19:59

## 2017-01-01 RX ADMIN — IPRATROPIUM BROMIDE AND ALBUTEROL SULFATE 3 ML: .5; 3 SOLUTION RESPIRATORY (INHALATION) at 13:33

## 2017-01-01 RX ADMIN — Medication 325 MG: at 09:03

## 2017-01-01 RX ADMIN — ACETAMINOPHEN 650 MG: 325 TABLET ORAL at 12:00

## 2017-01-01 RX ADMIN — GUAIFENESIN 600 MG: 600 TABLET, EXTENDED RELEASE ORAL at 20:07

## 2017-01-01 RX ADMIN — ISOSORBIDE MONONITRATE 30 MG: 30 TABLET, EXTENDED RELEASE ORAL at 10:33

## 2017-01-01 RX ADMIN — DIPHENHYDRAMINE HYDROCHLORIDE, ZINC ACETATE: 2; .1 CREAM TOPICAL at 21:27

## 2017-01-01 RX ADMIN — ROPINIROLE HYDROCHLORIDE 3.5 MG: 2 TABLET, FILM COATED ORAL at 05:36

## 2017-01-01 RX ADMIN — GABAPENTIN 600 MG: 300 CAPSULE ORAL at 09:58

## 2017-01-01 RX ADMIN — PERMETHRIN: 50 CREAM TOPICAL at 23:36

## 2017-01-01 RX ADMIN — MONTELUKAST SODIUM 10 MG: 10 TABLET, FILM COATED ORAL at 20:28

## 2017-01-01 RX ADMIN — ASPIRIN 81 MG: 81 TABLET, CHEWABLE ORAL at 10:34

## 2017-01-01 RX ADMIN — NYSTATIN AND TRIAMCINOLONE ACETONIDE 1 APPLICATION: 100000; 1 CREAM TOPICAL at 01:53

## 2017-01-01 RX ADMIN — ROPINIROLE HYDROCHLORIDE 3 MG: 2 TABLET, FILM COATED ORAL at 20:03

## 2017-01-01 RX ADMIN — POTASSIUM CHLORIDE 10 MEQ: 750 CAPSULE, EXTENDED RELEASE ORAL at 09:04

## 2017-01-01 RX ADMIN — FUROSEMIDE 40 MG: 40 TABLET ORAL at 09:04

## 2017-01-01 RX ADMIN — ROPINIROLE HYDROCHLORIDE 3.5 MG: 2 TABLET, FILM COATED ORAL at 14:29

## 2017-01-01 RX ADMIN — SOTALOL HYDROCHLORIDE 40 MG: 80 TABLET ORAL at 09:02

## 2017-01-01 RX ADMIN — FUROSEMIDE 20 MG: 10 INJECTION, SOLUTION INTRAMUSCULAR; INTRAVENOUS at 15:58

## 2017-01-01 RX ADMIN — MENTHOL, CAMPHOR 1 APPLICATION: 1.11; 1.11 LOTION TOPICAL at 21:04

## 2017-01-01 RX ADMIN — POTASSIUM CHLORIDE 10 MEQ: 750 CAPSULE, EXTENDED RELEASE ORAL at 10:01

## 2017-01-01 RX ADMIN — IPRATROPIUM BROMIDE AND ALBUTEROL SULFATE 3 ML: .5; 3 SOLUTION RESPIRATORY (INHALATION) at 14:04

## 2017-01-01 RX ADMIN — AZTREONAM 2 G: 2 INJECTION, POWDER, LYOPHILIZED, FOR SOLUTION INTRAMUSCULAR; INTRAVENOUS at 18:49

## 2017-01-01 RX ADMIN — MENTHOL, CAMPHOR: 1.11; 1.11 LOTION TOPICAL at 17:56

## 2017-01-01 RX ADMIN — IPRATROPIUM BROMIDE AND ALBUTEROL SULFATE 3 ML: .5; 3 SOLUTION RESPIRATORY (INHALATION) at 13:35

## 2017-01-01 RX ADMIN — IPRATROPIUM BROMIDE AND ALBUTEROL SULFATE 3 ML: .5; 3 SOLUTION RESPIRATORY (INHALATION) at 16:47

## 2017-01-01 RX ADMIN — CETIRIZINE HYDROCHLORIDE 10 MG: 10 TABLET, FILM COATED ORAL at 12:34

## 2017-01-01 RX ADMIN — ARFORMOTEROL TARTRATE 15 MCG: 15 SOLUTION RESPIRATORY (INHALATION) at 07:27

## 2017-01-01 RX ADMIN — POTASSIUM CHLORIDE 20 MEQ: 1.5 POWDER, FOR SOLUTION ORAL at 23:36

## 2017-01-01 RX ADMIN — FAMOTIDINE 20 MG: 20 TABLET ORAL at 18:53

## 2017-01-01 RX ADMIN — METHYLPREDNISOLONE SODIUM SUCCINATE 30 MG: 125 INJECTION, POWDER, FOR SOLUTION INTRAMUSCULAR; INTRAVENOUS at 13:24

## 2017-01-01 RX ADMIN — OXYCODONE HYDROCHLORIDE AND ACETAMINOPHEN 500 MG: 500 TABLET ORAL at 18:52

## 2017-01-01 RX ADMIN — ROPINIROLE HYDROCHLORIDE 3.5 MG: 2 TABLET, FILM COATED ORAL at 06:28

## 2017-01-01 RX ADMIN — GABAPENTIN 600 MG: 300 CAPSULE ORAL at 10:27

## 2017-01-01 RX ADMIN — DOXYCYCLINE 100 MG: 100 INJECTION, POWDER, LYOPHILIZED, FOR SOLUTION INTRAVENOUS at 23:33

## 2017-01-01 RX ADMIN — ROPINIROLE HYDROCHLORIDE 3 MG: 2 TABLET, FILM COATED ORAL at 16:47

## 2017-01-01 RX ADMIN — IPRATROPIUM BROMIDE AND ALBUTEROL SULFATE 3 ML: .5; 3 SOLUTION RESPIRATORY (INHALATION) at 19:31

## 2017-01-01 RX ADMIN — ROFLUMILAST 500 MCG: 500 TABLET ORAL at 12:33

## 2017-01-01 RX ADMIN — POLYETHYLENE GLYCOL 3350 17 G: 17 POWDER, FOR SOLUTION ORAL at 10:38

## 2017-01-01 RX ADMIN — INSULIN LISPRO 2 UNITS: 100 INJECTION, SOLUTION INTRAVENOUS; SUBCUTANEOUS at 18:56

## 2017-01-01 RX ADMIN — BUDESONIDE 0.5 MG: 0.5 INHALANT RESPIRATORY (INHALATION) at 19:32

## 2017-01-01 RX ADMIN — SOTALOL HYDROCHLORIDE 40 MG: 80 TABLET ORAL at 20:28

## 2017-01-01 RX ADMIN — IPRATROPIUM BROMIDE AND ALBUTEROL SULFATE 3 ML: .5; 3 SOLUTION RESPIRATORY (INHALATION) at 19:05

## 2017-01-01 RX ADMIN — Medication 250 MG: at 18:53

## 2017-01-01 RX ADMIN — AZTREONAM 2 G: 2 INJECTION, POWDER, LYOPHILIZED, FOR SOLUTION INTRAMUSCULAR; INTRAVENOUS at 11:03

## 2017-01-01 RX ADMIN — IPRATROPIUM BROMIDE AND ALBUTEROL SULFATE 3 ML: .5; 3 SOLUTION RESPIRATORY (INHALATION) at 00:49

## 2017-01-01 RX ADMIN — SOTALOL HYDROCHLORIDE 40 MG: 80 TABLET ORAL at 08:51

## 2017-01-01 RX ADMIN — ISOSORBIDE MONONITRATE 30 MG: 30 TABLET, EXTENDED RELEASE ORAL at 14:16

## 2017-01-01 RX ADMIN — MONTELUKAST SODIUM 10 MG: 10 TABLET, FILM COATED ORAL at 22:18

## 2017-01-01 RX ADMIN — DOXYCYCLINE HYCLATE 100 MG: 100 CAPSULE ORAL at 06:02

## 2017-01-01 RX ADMIN — GABAPENTIN 600 MG: 300 CAPSULE ORAL at 20:28

## 2017-01-01 RX ADMIN — POLYETHYLENE GLYCOL 3350 17 G: 17 POWDER, FOR SOLUTION ORAL at 09:01

## 2017-01-01 RX ADMIN — NYSTATIN AND TRIAMCINOLONE ACETONIDE: 100000; 1 CREAM TOPICAL at 10:49

## 2017-01-01 RX ADMIN — IPRATROPIUM BROMIDE AND ALBUTEROL SULFATE 3 ML: .5; 3 SOLUTION RESPIRATORY (INHALATION) at 07:13

## 2017-01-01 RX ADMIN — GUAIFENESIN 600 MG: 600 TABLET, EXTENDED RELEASE ORAL at 08:52

## 2017-01-01 RX ADMIN — AMITRIPTYLINE HYDROCHLORIDE 50 MG: 50 TABLET, FILM COATED ORAL at 21:33

## 2017-01-01 RX ADMIN — ARFORMOTEROL TARTRATE 15 MCG: 15 SOLUTION RESPIRATORY (INHALATION) at 19:59

## 2017-01-01 RX ADMIN — DIPHENHYDRAMINE HYDROCHLORIDE 12.5 MG: 50 INJECTION INTRAMUSCULAR; INTRAVENOUS at 03:19

## 2017-01-01 RX ADMIN — FAMOTIDINE 20 MG: 20 TABLET ORAL at 12:00

## 2017-01-01 RX ADMIN — GLYCOPYRROLATE 0.2 MG: 0.2 INJECTION, SOLUTION INTRAMUSCULAR; INTRAVENOUS at 17:56

## 2017-01-01 RX ADMIN — DOXYCYCLINE HYCLATE 100 MG: 100 CAPSULE ORAL at 18:07

## 2017-01-01 RX ADMIN — MENTHOL, CAMPHOR: 1.11; 1.11 LOTION TOPICAL at 13:14

## 2017-01-01 RX ADMIN — IPRATROPIUM BROMIDE AND ALBUTEROL SULFATE 3 ML: .5; 3 SOLUTION RESPIRATORY (INHALATION) at 07:24

## 2017-01-01 RX ADMIN — HEPARIN SODIUM 5000 UNITS: 5000 INJECTION, SOLUTION INTRAVENOUS; SUBCUTANEOUS at 08:51

## 2017-01-01 RX ADMIN — SOTALOL HYDROCHLORIDE 40 MG: 80 TABLET ORAL at 20:08

## 2017-01-01 RX ADMIN — AZTREONAM 2 G: 2 INJECTION, POWDER, LYOPHILIZED, FOR SOLUTION INTRAMUSCULAR; INTRAVENOUS at 11:30

## 2017-01-01 RX ADMIN — Medication 325 MG: at 09:59

## 2017-01-01 RX ADMIN — IPRATROPIUM BROMIDE AND ALBUTEROL SULFATE 3 ML: .5; 3 SOLUTION RESPIRATORY (INHALATION) at 08:33

## 2017-01-01 RX ADMIN — METHYLPREDNISOLONE SODIUM SUCCINATE 30 MG: 125 INJECTION, POWDER, FOR SOLUTION INTRAMUSCULAR; INTRAVENOUS at 14:31

## 2017-01-01 RX ADMIN — MULTIPLE VITAMINS W/ MINERALS TAB 1 TABLET: TAB ORAL at 08:52

## 2017-01-01 RX ADMIN — NYSTATIN AND TRIAMCINOLONE ACETONIDE: 100000; 1 CREAM TOPICAL at 22:21

## 2017-01-01 RX ADMIN — ARFORMOTEROL TARTRATE 15 MCG: 15 SOLUTION RESPIRATORY (INHALATION) at 07:13

## 2017-01-01 RX ADMIN — INSULIN LISPRO 2 UNITS: 100 INJECTION, SOLUTION INTRAVENOUS; SUBCUTANEOUS at 11:29

## 2017-01-01 RX ADMIN — MULTIPLE VITAMINS W/ MINERALS TAB 1 TABLET: TAB ORAL at 10:27

## 2017-01-01 RX ADMIN — IPRATROPIUM BROMIDE AND ALBUTEROL SULFATE 3 ML: .5; 3 SOLUTION RESPIRATORY (INHALATION) at 13:17

## 2017-01-01 RX ADMIN — HEPARIN SODIUM 5000 UNITS: 5000 INJECTION, SOLUTION INTRAVENOUS; SUBCUTANEOUS at 20:20

## 2017-01-01 RX ADMIN — VANCOMYCIN HYDROCHLORIDE 750 MG: 750 INJECTION, SOLUTION INTRAVENOUS at 15:21

## 2017-01-01 RX ADMIN — IPRATROPIUM BROMIDE AND ALBUTEROL SULFATE 3 ML: .5; 3 SOLUTION RESPIRATORY (INHALATION) at 19:32

## 2017-01-01 RX ADMIN — IPRATROPIUM BROMIDE AND ALBUTEROL SULFATE 3 ML: .5; 3 SOLUTION RESPIRATORY (INHALATION) at 00:55

## 2017-01-01 RX ADMIN — ROPINIROLE HYDROCHLORIDE 3.5 MG: 2 TABLET, FILM COATED ORAL at 22:17

## 2017-01-01 RX ADMIN — SOTALOL HYDROCHLORIDE 40 MG: 80 TABLET ORAL at 09:59

## 2017-01-01 RX ADMIN — ROPINIROLE HYDROCHLORIDE 3.5 MG: 2 TABLET, FILM COATED ORAL at 13:28

## 2017-01-01 RX ADMIN — DEXMEDETOMIDINE HYDROCHLORIDE 0.6 MCG/KG/HR: 4 INJECTION, SOLUTION INTRAVENOUS at 11:10

## 2017-01-01 RX ADMIN — HEPARIN SODIUM 5000 UNITS: 5000 INJECTION, SOLUTION INTRAVENOUS; SUBCUTANEOUS at 21:35

## 2017-01-01 RX ADMIN — ARFORMOTEROL TARTRATE 15 MCG: 15 SOLUTION RESPIRATORY (INHALATION) at 08:33

## 2017-01-01 RX ADMIN — LEVOTHYROXINE SODIUM 25 MCG: 25 TABLET ORAL at 06:02

## 2017-01-01 RX ADMIN — METHYLPREDNISOLONE SODIUM SUCCINATE 30 MG: 125 INJECTION, POWDER, FOR SOLUTION INTRAMUSCULAR; INTRAVENOUS at 05:25

## 2017-01-01 RX ADMIN — BUDESONIDE 0.5 MG: 0.5 INHALANT RESPIRATORY (INHALATION) at 07:27

## 2017-01-01 RX ADMIN — FERROUS SULFATE TAB 325 MG (65 MG ELEMENTAL FE) 325 MG: 325 (65 FE) TAB at 08:53

## 2017-01-01 RX ADMIN — ISOSORBIDE MONONITRATE 30 MG: 30 TABLET, EXTENDED RELEASE ORAL at 09:59

## 2017-01-01 RX ADMIN — SOTALOL HYDROCHLORIDE 40 MG: 80 TABLET ORAL at 21:22

## 2017-01-01 RX ADMIN — IPRATROPIUM BROMIDE AND ALBUTEROL SULFATE 3 ML: .5; 3 SOLUTION RESPIRATORY (INHALATION) at 01:04

## 2017-01-01 RX ADMIN — FLUTICASONE PROPIONATE 2 SPRAY: 50 SPRAY, METERED NASAL at 09:59

## 2017-01-01 RX ADMIN — MULTIPLE VITAMINS W/ MINERALS TAB: TAB ORAL at 12:34

## 2017-01-01 RX ADMIN — ARFORMOTEROL TARTRATE 15 MCG: 15 SOLUTION RESPIRATORY (INHALATION) at 19:32

## 2017-01-01 RX ADMIN — IPRATROPIUM BROMIDE AND ALBUTEROL SULFATE 3 ML: .5; 3 SOLUTION RESPIRATORY (INHALATION) at 00:21

## 2017-01-01 RX ADMIN — ROFLUMILAST 500 MCG: 500 TABLET ORAL at 10:34

## 2017-01-01 RX ADMIN — DIPHENHYDRAMINE HYDROCHLORIDE, ZINC ACETATE: 2; .1 CREAM TOPICAL at 22:21

## 2017-01-01 RX ADMIN — AZTREONAM 2 G: 2 INJECTION, POWDER, LYOPHILIZED, FOR SOLUTION INTRAMUSCULAR; INTRAVENOUS at 03:25

## 2017-01-01 RX ADMIN — OXYCODONE HYDROCHLORIDE AND ACETAMINOPHEN 500 MG: 500 TABLET ORAL at 12:33

## 2017-01-01 RX ADMIN — LORAZEPAM 1 MG: 2 INJECTION, SOLUTION INTRAMUSCULAR; INTRAVENOUS at 08:05

## 2017-01-01 RX ADMIN — FLUTICASONE PROPIONATE 2 SPRAY: 50 SPRAY, METERED NASAL at 09:01

## 2017-01-01 RX ADMIN — GUAIFENESIN 600 MG: 600 TABLET, EXTENDED RELEASE ORAL at 21:34

## 2017-01-01 RX ADMIN — ROPINIROLE HYDROCHLORIDE 3 MG: 2 TABLET, FILM COATED ORAL at 16:26

## 2017-03-06 PROBLEM — R53.83 FATIGUE: Status: ACTIVE | Noted: 2017-01-01

## 2017-03-06 PROBLEM — B86 SCABIES: Status: ACTIVE | Noted: 2017-01-01

## 2017-03-06 NOTE — H&P
Hospital Medicine History and Physical    Primary Care Physician: No Known Provider    Chief complaint: fatigue    Subjective     History of Present Illness  Ms. Dey is a 89 yo female who presents to ED with c/o fatigue.  Per family patient with generalized weakness and increased confusion over the last two days. Shortness of air and moderate respiratory distress on initial presentation to ED.  Patient is normally on bipap at night and O2NC during day with history of COPD.  In ED started on bipap and patient states breathing has improved.  O2 sats currently 96%.  Family noted the patient has been unable to get around the last two days but is normally able to walk around with a walker. Patient lives with her two daughters. Recent diagnosis with scabies that has not been treated.       Review of Systems   Constitutional: Positive for activity change and fatigue. Negative for chills and fever.   HENT: Negative for congestion, rhinorrhea and sneezing.    Eyes: Negative for visual disturbance.   Respiratory: Positive for cough and shortness of breath.    Cardiovascular: Negative for chest pain and palpitations.   Gastrointestinal: Negative for constipation, diarrhea, nausea and vomiting.   Genitourinary: Negative for difficulty urinating and dyspareunia.   Skin: Positive for rash and wound.        C/o itching   Neurological: Positive for weakness.   Hematological: Bruises/bleeds easily.   Psychiatric/Behavioral: Positive for confusion.      Otherwise complete ROS is negative except as mentioned in the HPI.    Past Medical History:   Past Medical History   Diagnosis Date   • Anemia    • Anxiety    • Arthritis    • Asthma    • Atrial fibrillation    • Cellulitis    • Chronic pain    • Chronic respiratory failure    • COPD (chronic obstructive pulmonary disease)    • Coronary artery disease    • Depression    • Diastolic congestive heart failure    • Dyslipidemia    • GERD (gastroesophageal reflux disease)    • H/O  fracture of rib    • History of MRSA infection of lungs      pneumonia   • History of small bowel obstruction    • Hyperlipidemia    • Hypertension    • Hypothyroid    • Pneumonia    • Restless leg syndrome    • S/P coronary artery stent placement    • Scabies        Past Surgical History:  Past Surgical History   Procedure Laterality Date   • Hand surgery     • Tonsillectomy     • Coronary stent placement       5   • Cardiac catheterization     • Chest tube insertion         Family History: family history includes Alzheimer's disease in her other; Cancer in her mother and other; Depression in her maternal uncle; Diabetes in her other; Heart disease in her father and other; Hyperlipidemia in her other; Multiple sclerosis in her other; Pneumonia in her other.     Social History:  reports that she has quit smoking. She quit after 15.00 years of use. She quit smokeless tobacco use about 3 years ago. She reports that she drinks about 0.6 - 1.2 oz of alcohol per week  She reports that she does not use illicit drugs.    Medications:   Outpatient Medications     amitriptyline (ELAVIL) 50 MG tablet      fluticasone (FLONASE ALLERGY RELIEF) 50 MCG/ACT nasal spray      levocetirizine (XYZAL) 5 MG tablet      NON FORMULARY      nystatin-triamcinolone (MYCOLOG II) 815427-1.1 UNIT/GM-% cream      predniSONE (DELTASONE) 10 MG tablet      acetaminophen (TYLENOL) 325 MG tablet      albuterol (ACCUNEB) 1.25 MG/3ML nebulizer solution      arformoterol (BROVANA) 15 MCG/2ML nebulizer solution      aspirin 81 MG tablet      docusate sodium (COLACE) 100 MG capsule      ferrous sulfate 325 (65 FE) MG tablet      fexofenadine (ALLEGRA) 180 MG tablet      fluticasone (VERAMYST) 27.5 MCG/SPRAY nasal spray      furosemide (LASIX) 40 MG tablet      gabapentin (NEURONTIN) 300 MG capsule      guaiFENesin (MUCINEX) 600 MG 12 hr tablet      hydrOXYzine (ATARAX) 10 MG tablet      isosorbide mononitrate (IMDUR) 30 MG 24 hr tablet      levothyroxine  (SYNTHROID, LEVOTHROID) 25 MCG tablet      mometasone-formoterol (DULERA) 200-5 MCG/ACT inhaler      montelukast (SINGULAIR) 10 MG tablet      Multiple Vitamins-Minerals (PRESERVISION AREDS 2 PO)      ondansetron (ZOFRAN) 4 MG tablet      polyethylene glycol (MIRALAX) packet      potassium chloride (MICRO-K) 10 MEQ CR capsule      ranitidine (ZANTAC) 150 MG tablet      roflumilast (DALIRESP) 500 MCG tablet tablet      rOPINIRole (REQUIP) 3 MG tablet      silver sulfadiazine (SILVADENE, SSD) 1 % cream      simvastatin (ZOCOR) 10 MG tablet      sotalol (BETAPACE) 80 MG tablet        Prescriptions Prior to Admission   Medication Sig Dispense Refill Last Dose   • amitriptyline (ELAVIL) 50 MG tablet Take 50 mg by mouth Every Night.      • fluticasone (FLONASE ALLERGY RELIEF) 50 MCG/ACT nasal spray 2 sprays into each nostril Daily.      • levocetirizine (XYZAL) 5 MG tablet Take 5 mg by mouth Every Evening.      • NON FORMULARY 5 mg. AREDS2 EYE SUPPLEMENT BID      • nystatin-triamcinolone (MYCOLOG II) 262637-4.1 UNIT/GM-% cream Apply  topically As Needed.      • predniSONE (DELTASONE) 10 MG tablet Take 5 mg by mouth 2 (Two) Times a Day.      • acetaminophen (TYLENOL) 325 MG tablet Take 650 mg by mouth every 4 (four) hours as needed for mild pain (1-3) or fever.      • albuterol (ACCUNEB) 1.25 MG/3ML nebulizer solution Take 1 ampule by nebulization 2 (two) times a day as needed for wheezing.   8/16/2016 at Unknown time   • arformoterol (BROVANA) 15 MCG/2ML nebulizer solution Take 15 mcg by nebulization 2 (two) times a day.   8/16/2016 at Unknown time   • aspirin 81 MG tablet Take 81 mg by mouth daily.   8/16/2016 at Unknown time   • docusate sodium (COLACE) 100 MG capsule Take 100 mg by mouth 2 (two) times a day as needed for constipation.   8/16/2016 at Unknown time   • ferrous sulfate 325 (65 FE) MG tablet Take 325 mg by mouth daily.   8/16/2016 at Unknown time   • fexofenadine (ALLEGRA) 180 MG tablet Take 180 mg by mouth  daily.   8/16/2016 at Unknown time   • fluticasone (VERAMYST) 27.5 MCG/SPRAY nasal spray 2 sprays by Each Nare route daily.   8/16/2016   • furosemide (LASIX) 40 MG tablet Take 40 mg by mouth daily.   8/16/2016 at Unknown time   • gabapentin (NEURONTIN) 300 MG capsule Take 800 mg by mouth 2 (Two) Times a Day.   8/16/2016 at Unknown time   • guaiFENesin (MUCINEX) 600 MG 12 hr tablet Take 600 mg by mouth 2 (two) times a day.   8/16/2016 at Unknown time   • hydrOXYzine (ATARAX) 10 MG tablet Take 25 mg by mouth Every 8 (Eight) Hours As Needed for itching.   8/16/2016   • isosorbide mononitrate (IMDUR) 30 MG 24 hr tablet Take 30 mg by mouth 2 (two) times a day.   8/16/2016 at Unknown time   • levothyroxine (SYNTHROID, LEVOTHROID) 25 MCG tablet Take 25 mcg by mouth daily.   8/16/2016 at Unknown time   • mometasone-formoterol (DULERA) 200-5 MCG/ACT inhaler 1 puff 2 (two) times a day.   8/16/2016 at Unknown time   • montelukast (SINGULAIR) 10 MG tablet Take 10 mg by mouth daily.   8/16/2016 at Unknown time   • Multiple Vitamins-Minerals (PRESERVISION AREDS 2 PO) Take 1 capsule by mouth 2 (two) times a day.   8/16/2016 at Unknown time   • ondansetron (ZOFRAN) 4 MG tablet Take 4 mg by mouth every 8 (eight) hours as needed for nausea or vomiting.   8/16/2016 at Unknown time   • polyethylene glycol (MIRALAX) packet Take 17 g by mouth daily as needed.      • potassium chloride (MICRO-K) 10 MEQ CR capsule Take 10 mEq by mouth daily.   8/16/2016 at Unknown time   • ranitidine (ZANTAC) 150 MG tablet Take 150 mg by mouth every night.   8/16/2016 at Unknown time   • roflumilast (DALIRESP) 500 MCG tablet tablet Take 500 mcg by mouth daily.   8/16/2016 at Unknown time   • rOPINIRole (REQUIP) 3 MG tablet Take 3 mg by mouth 3 (three) times a day.   8/16/2016 at Unknown time   • silver sulfadiazine (SILVADENE, SSD) 1 % cream Apply 1 application topically 2 (two) times a day as needed for wound care.   Unknown at Unknown time   •  "simvastatin (ZOCOR) 10 MG tablet Take 10 mg by mouth every night.   8/16/2016 at Unknown time   • sotalol (BETAPACE) 80 MG tablet Take 40 mg by mouth 2 (two) times a day.   8/16/2016 at Unknown time     Allergies   Allergen Reactions   • Altace [Ramipril]    • Penicillins Other (See Comments), Rash and Swelling       Objective    Physical Exam:   Vital Signs:   Visit Vitals   • /74   • Pulse 89   • Temp 97.9 °F (36.6 °C) (Oral)   • Resp 16   • Ht 63\" (160 cm)   • Wt 125 lb 9.6 oz (57 kg)   • SpO2 97%   • BMI 22.25 kg/m2     Physical Exam  Gen-no acute distress, looks comfortable on bipap  HEENT-atraumatic, normocephalic, PERRLA, EOMI, moist mucous membranes present  CV-RRR, S1 S2 normal, no m/r/g  Resp-loud bipap sounds, poor air entry bilaterally, no wheezing  Abd-soft, NT, ND, +BS; no rebound or guarding  Ext-no edema or clubbing  Skin-multiple lesions and excoriations throughout c/w scabies  Neuro-A&Ox3, CN II-XII grossly intact; moves all extremities.  Psych-pleasant    Results Reviewed:  I have personally reviewed current lab, radiology, and data and agree.  Lab Results (last 24 hours)     Procedure Component Value Units Date/Time    Blood Gas, Venous [[id number]]  (Abnormal) Collected:  03/06/17 1708    Specimen:  Venous Blood Updated:  03/06/17 1712     Site Venous      pH, Venous 7.409      pCO2, Venous 61.0 (H) mm Hg      pO2, Venous 33.2 mm Hg      HCO3, Venous 38.6 (H) mmol/L      Base Excess, Venous 12.0 (H) mmol/L      Hemoglobin, Blood Gas 16.1 g/dL      Oxyhemoglobin 54.3 (L) %      Methemoglobin 0.6 %      Carboxyhemoglobin 1.8 %      CO2 Content 40.4 (H)      Barometric Pressure for Blood Gas -- mmHg       N/A        Modality Cannula      FIO2 28 %     CBC Auto Differential [[id number]]  (Abnormal) Collected:  03/06/17 1711    Specimen:  Blood Updated:  03/06/17 1734     WBC 9.12 10*3/mm3      RBC 4.84 10*6/mm3      Hemoglobin 14.6 g/dL      Hematocrit 47.4 (H) %      MCV 97.9 fL      MCH 30.2 pg  "     MCHC 30.8 (L) g/dL      RDW 15.3 (H) %      RDW-SD 55.4 (H) fl      MPV 10.6 fL      Platelets 196 10*3/mm3      Neutrophil % 83.2 (H) %      Lymphocyte % 6.8 (L) %      Monocyte % 6.0 %      Eosinophil % 3.3 (H) %      Basophil % 0.4 %      Immature Grans % 0.3 %      Neutrophils, Absolute 7.58 10*3/mm3      Lymphocytes, Absolute 0.62 10*3/mm3      Monocytes, Absolute 0.55 10*3/mm3      Eosinophils, Absolute 0.30 10*3/mm3      Basophils, Absolute 0.04 10*3/mm3      Immature Grans, Absolute 0.03 10*3/mm3     POC Troponin, Rapid [[id number]]  (Normal) Collected:  03/06/17 1715    Specimen:  Blood Updated:  03/06/17 1735     Troponin I 0.02 ng/mL       Serial Number: [id number]    : [id number]       C-reactive Protein [[id number]]  (Abnormal) Collected:  03/06/17 1711    Specimen:  Blood Updated:  03/06/17 1751     C-Reactive Protein 82.70 (H) mg/dL     Comprehensive Metabolic Panel [[id number]]  (Abnormal) Collected:  03/06/17 1711    Specimen:  Blood Updated:  03/06/17 1757     Glucose 174 (H) mg/dL      BUN 26 (H) mg/dL      Creatinine 0.80 mg/dL      Sodium 146 mmol/L      Potassium 3.0 (L) mmol/L      Chloride 100 mmol/L      CO2 36.0 (H) mmol/L      Calcium 9.3 mg/dL      Total Protein 6.0 g/dL      Albumin 3.40 g/dL      ALT (SGPT) 15 U/L      AST (SGOT) 19 U/L      Alkaline Phosphatase 97 U/L      Total Bilirubin 0.4 mg/dL      eGFR Non African Amer 67 mL/min/1.73      Globulin 2.6 gm/dL      A/G Ratio 1.3 (L) g/dL      BUN/Creatinine Ratio 32.5 (H)      Anion Gap 10.0 mmol/L     BNP [[id number]]  (Abnormal) Collected:  03/06/17 1711    Specimen:  Blood Updated:  03/06/17 1757     .0 (H) pg/mL     Lactic Acid, Plasma [[id number]]  (Normal) Collected:  03/06/17 1711    Specimen:  Blood Updated:  03/06/17 1807     Lactate 1.8 mmol/L       Falsely depressed results may occur on samples drawn from patients receiving N-Acetylcysteine (NAC) or Metamizole.       Magnesium [[id number]]  (Normal) Collected:  03/06/17  1711    Specimen:  Blood Updated:  03/06/17 1830     Magnesium 2.1 mg/dL      Personally reviewed CXR with chronic changes, but no acute changes. Agree with interpretation.    Imaging Results (last 24 hours)     Procedure Component Value Units Date/Time    XR Femur 2 View Left [[id number]]      Updated:  03/06/17 1637    XR Tibia Fibula 2 View Left [[id number]]      Updated:  03/06/17 1637    XR Chest 1 View [[id number]] Collected:  03/06/17 1649     Updated:  03/06/17 1649    Narrative:       EXAMINATION: XR CHEST 1 VIEW-03/06/2017:      INDICATION: CHF/COPD protocol.      COMPARISON: 08/17/2015.     FINDINGS: Portable chest reveals the heart to be enlarged. Vascular  calcification seen within the thoracic aorta. Underlying chronic changes  seen within the lung fields bilaterally. Nodules seen within the right  upper lobe and periphery. Degenerative changes seen within the spine.   No pleural effusion or pneumothorax. Pulmonary vascularity is within  normal limits.           Impression:       Stable chronic changes seen within the lung fields with no  evidence of acute parenchymal disease.     D:  03/06/2017  E:  03/06/2017                       Assessment/Plan   Assessment & Plan  Principal Problem:    Acute on chronic respiratory failure with hypoxia and hypercapnia  Active Problems:    Hypothyroid    COPD exacerbation    Hypertension    Fatigue    Scabies    Plan:  --IV steroids and abx for COPD exacerbation.  --Bipap as needed  --Permethrin for scabies.  --Palliative consultation.    I discussed the patients findings and my recommendations with: Patient and family.    Mary Dickerson II, DO 03/06/17 8:29 PM

## 2017-03-06 NOTE — ED PROVIDER NOTES
Subjective   HPI Comments: Pattie Dey is a 89 y.o.female who presents to the ED with c/o fatigue. The patients daughter reports the patient has scabies, which is suppose to be treated later this afternoon. She reports the patient has been experiencing generalized weakness and has had increased confusion over the last two days. She notes the patient has been unable to get around the last two days, and is normally able to walk around with a walker, presenting into the ED for evaluation. Additionally, the patient c/o left leg pain, but denies any other acute complaints at this time.     Patient is a 89 y.o. female presenting with fatigue.   History provided by:  Relative and patient  Fatigue   Location:  Generalized fatigue  Severity:  Moderate  Onset quality:  Sudden  Duration:  2 days  Timing:  Constant  Progression:  Worsening  Context:  The patient has been experiencing generalized weakness and increased confusion over last two days.   Relieved by:  None tried  Worsened by:  Nothing  Ineffective treatments:  None tried  Associated symptoms: fatigue    Associated symptoms: no chest pain, no congestion, no cough, no diarrhea, no fever, no rhinorrhea, no shortness of breath and no sore throat        Review of Systems   Constitutional: Positive for fatigue. Negative for fever.   HENT: Negative for congestion, rhinorrhea and sore throat.    Respiratory: Negative for cough and shortness of breath.    Cardiovascular: Negative for chest pain.   Gastrointestinal: Negative for diarrhea.   Musculoskeletal:        Left leg pain.   Neurological: Positive for weakness (generazlied).   Psychiatric/Behavioral: Positive for confusion (increased worsening).   All other systems reviewed and are negative.      Past Medical History   Diagnosis Date   • Anemia    • Anxiety    • Arthritis    • Asthma    • Atrial fibrillation    • Cellulitis    • Chronic pain    • Chronic respiratory failure    • COPD (chronic obstructive pulmonary  disease)    • Coronary artery disease    • Depression    • Diastolic congestive heart failure    • Dyslipidemia    • GERD (gastroesophageal reflux disease)    • H/O fracture of rib    • History of MRSA infection of lungs      pneumonia   • History of small bowel obstruction    • Hyperlipidemia    • Hypertension    • Hypothyroid    • Pneumonia    • Restless leg syndrome    • S/P coronary artery stent placement    • Scabies        Allergies   Allergen Reactions   • Altace [Ramipril]    • Penicillins Other (See Comments), Rash and Swelling       Past Surgical History   Procedure Laterality Date   • Hand surgery     • Tonsillectomy     • Coronary stent placement       5   • Cardiac catheterization     • Chest tube insertion         Family History   Problem Relation Age of Onset   • Cancer Mother      Pancreatic Cancer in her 50's   • Heart disease Father    • Depression Maternal Uncle    • Heart disease Other    • Cancer Other    • Alzheimer's disease Other    • Pneumonia Other    • Hyperlipidemia Other    • Diabetes Other    • Multiple sclerosis Other        Social History     Social History   • Marital status:      Spouse name: N/A   • Number of children: 12   • Years of education: N/A     Social History Main Topics   • Smoking status: Former Smoker     Years: 15.00   • Smokeless tobacco: Former User     Quit date: 2014      Comment: quit 100% 1 year ago   • Alcohol use 0.6 - 1.2 oz/week     1 - 2 Glasses of wine per week      Comment: every couple of months   • Drug use: No   • Sexual activity: Defer     Other Topics Concern   • None     Social History Narrative    Currently a resident at [facility], but typically lives with youngest daughter         Objective   Physical Exam   Constitutional: No distress.   HENT:   Head: Normocephalic and atraumatic.   Oropharynx is dry, no erythema.    Eyes: Pupils are equal, round, and reactive to light. No scleral icterus.   Mild conjunctivae.   Neck: Normal  range of motion. Neck supple.   Cardiovascular: Normal rate, regular rhythm and normal heart sounds.    Pulmonary/Chest: She is in respiratory distress (moderate).   Diminished breath sounds, crackles in bases of lungs greater in left lung than right.    Abdominal: Soft. Bowel sounds are normal. She exhibits distension (mild). There is no tenderness.   Musculoskeletal: Normal range of motion. She exhibits no edema.   Neurological: She is alert.   Skin: Skin is warm and dry. She is not diaphoretic.   Multiple ecchymosis on all extremities and torso. Large 3+ cm open lesion on right supraclavicular area.   Vitals reviewed.      Critical Care  Performed by: MARY CHAPMAN  Authorized by: MARY CHAPMAN   Total critical care time: 40 minutes  Critical care was necessary to treat or prevent imminent or life-threatening deterioration of the following conditions: respiratory failure.  Critical care was time spent personally by me on the following activities: evaluation of patient's response to treatment, ordering and performing treatments and interventions, pulse oximetry, development of treatment plan with patient or surrogate, examination of patient, ordering and review of laboratory studies, re-evaluation of patient's condition, obtaining history from patient or surrogate, ordering and review of radiographic studies, review of old charts and ventilator management.               ED Course  ED Course   Comment By Time   Dr. Chapman reviewed old charts from 2016, patient experienced acute on chronic respiratory failure with hypoxia and hypercapnia. Pt had LUE cellulitis at this time. Hx CHF, A-Fib, COPD.  Rosemary Siegel [facility] 03/06 1526   This findings at length with patient and family.  Patient has significant decline over the last week but markedly worse over the last day.  Her speech is slurred on examination.  Despite this however she refuses ABG.  I discussed current evaluation with patient and family who all agree.   Family reports and DO NOT INTUBATE, and DO NOT RESUSCITATE status for the patient Olman Chapman MD 03/06 1543   Patient seemed initially improved however her sats up again fallen, similar to that presentation.  On repeated neb, and start her on BiPAP in view of her elevated PCO2, though her pH is not low.  She has responded well to BiPAP per the family the past. Olman Chapman MD 03/06 8666   Patient continues to have confusion and generalized weakness.  Sats remain low pending BiPAP. Olman Chapman MD 03/06 1805   Dr. Chapman discussed with hospitalist.  Rosemary Siegel Marcela 03/06 1822   Sats improved after BiPAP, currently 97%. Olman Chapman MD 03/06 1832     Recent Results (from the past 24 hour(s))   Blood Gas, Venous    Collection Time: 03/06/17  5:08 PM   Result Value Ref Range    Site Venous     pH, Venous 7.409 7.250 - 7.500    pCO2, Venous 61.0 (H) 41.0 - 51.0 mm Hg    pO2, Venous 33.2 27.0 - 53.0 mm Hg    HCO3, Venous 38.6 (H) 22.0 - 28.0 mmol/L    Base Excess, Venous 12.0 (H) -2.0 - 2.0 mmol/L    Hemoglobin, Blood Gas 16.1 14 - 18 g/dL    Oxyhemoglobin 54.3 (L) 94 - 99 %    Methemoglobin 0.6 0 - 1.5 %    Carboxyhemoglobin 1.8 0 - 2 %    CO2 Content 40.4 (H) 22 - 33    Barometric Pressure for Blood Gas  mmHg    Modality Cannula     FIO2 28 %   Comprehensive Metabolic Panel    Collection Time: 03/06/17  5:11 PM   Result Value Ref Range    Glucose 174 (H) 70 - 100 mg/dL    BUN 26 (H) 9 - 23 mg/dL    Creatinine 0.80 0.60 - 1.30 mg/dL    Sodium 146 132 - 146 mmol/L    Potassium 3.0 (L) 3.5 - 5.5 mmol/L    Chloride 100 99 - 109 mmol/L    CO2 36.0 (H) 20.0 - 31.0 mmol/L    Calcium 9.3 8.7 - 10.4 mg/dL    Total Protein 6.0 5.7 - 8.2 g/dL    Albumin 3.40 3.20 - 4.80 g/dL    ALT (SGPT) 15 7 - 40 U/L    AST (SGOT) 19 0 - 33 U/L    Alkaline Phosphatase 97 25 - 100 U/L    Total Bilirubin 0.4 0.3 - 1.2 mg/dL    eGFR Non African Amer 67 >60 mL/min/1.73    Globulin 2.6 gm/dL    A/G Ratio 1.3 (L) 1.5 - 2.5 g/dL     BUN/Creatinine Ratio 32.5 (H) 7.0 - 25.0    Anion Gap 10.0 3.0 - 11.0 mmol/L   BNP    Collection Time: 03/06/17  5:11 PM   Result Value Ref Range    .0 (H) 0.0 - 100.0 pg/mL   C-reactive Protein    Collection Time: 03/06/17  5:11 PM   Result Value Ref Range    C-Reactive Protein 82.70 (H) 0.00 - 10.00 mg/dL   Lactic Acid, Plasma    Collection Time: 03/06/17  5:11 PM   Result Value Ref Range    Lactate 1.8 0.5 - 2.0 mmol/L   CBC Auto Differential    Collection Time: 03/06/17  5:11 PM   Result Value Ref Range    WBC 9.12 3.50 - 10.80 10*3/mm3    RBC 4.84 3.89 - 5.14 10*6/mm3    Hemoglobin 14.6 11.5 - 15.5 g/dL    Hematocrit 47.4 (H) 34.5 - 44.0 %    MCV 97.9 80.0 - 99.0 fL    MCH 30.2 27.0 - 31.0 pg    MCHC 30.8 (L) 32.0 - 36.0 g/dL    RDW 15.3 (H) 11.3 - 14.5 %    RDW-SD 55.4 (H) 37.0 - 54.0 fl    MPV 10.6 6.0 - 12.0 fL    Platelets 196 150 - 450 10*3/mm3    Neutrophil % 83.2 (H) 41.0 - 71.0 %    Lymphocyte % 6.8 (L) 24.0 - 44.0 %    Monocyte % 6.0 0.0 - 12.0 %    Eosinophil % 3.3 (H) 0.0 - 3.0 %    Basophil % 0.4 0.0 - 1.0 %    Immature Grans % 0.3 0.0 - 0.6 %    Neutrophils, Absolute 7.58 1.50 - 8.30 10*3/mm3    Lymphocytes, Absolute 0.62 0.60 - 4.80 10*3/mm3    Monocytes, Absolute 0.55 0.00 - 1.00 10*3/mm3    Eosinophils, Absolute 0.30 0.10 - 0.30 10*3/mm3    Basophils, Absolute 0.04 0.00 - 0.20 10*3/mm3    Immature Grans, Absolute 0.03 0.00 - 0.03 10*3/mm3   Magnesium    Collection Time: 03/06/17  5:11 PM   Result Value Ref Range    Magnesium 2.1 1.3 - 2.7 mg/dL   POC Troponin, Rapid    Collection Time: 03/06/17  5:15 PM   Result Value Ref Range    Troponin I 0.02 0.00 - 0.07 ng/mL     Note: In addition to lab results from this visit, the labs listed above may include labs taken at another facility or during a different encounter within the last 24 hours. Please correlate lab times with ED admission and discharge times for further clarification of the services performed during this visit.    XR Chest 1  "View   Preliminary Result   Stable chronic changes seen within the lung fields with no   evidence of acute parenchymal disease.       D:  03/06/2017   E:  03/06/2017                  XR Femur 2 View Left    (Results Pending)   XR Tibia Fibula 2 View Left    (Results Pending)     Vitals:    03/06/17 1530 03/06/17 1830 03/06/17 1832 03/06/17 1927   BP: 94/65 102/59     Pulse:   81    Resp:       Temp:       SpO2:   97%    Weight:    125 lb 9.6 oz (57 kg)   Height:    63\" (160 cm)     Medications   sodium chloride 0.9 % flush 10 mL (not administered)   ipratropium-albuterol (DUO-NEB) nebulizer solution 3 mL (not administered)   potassium chloride (KLOR-CON) packet 20 mEq (not administered)   permethrin (ELIMITE) 5 % cream (not administered)   ipratropium-albuterol (DUO-NEB) nebulizer solution 3 mL (3 mL Nebulization Given 3/6/17 1647)   methylPREDNISolone sodium succinate (SOLU-Medrol) injection 125 mg (125 mg Intravenous Given 3/6/17 1815)     ECG/EMG Results (last 24 hours)     Procedure Component Value Units Date/Time    ECG 12 Lead [[id number]] Collected:  03/06/17 1645     Updated:  03/06/17 1812    Narrative:       Test Reason : SOA  Blood Pressure : **/** mmHG  Vent. Rate : 092 BPM     Atrial Rate : 092 BPM     P-R Int : 178 ms          QRS Dur : 082 ms      QT Int : 386 ms       P-R-T Axes : 071 -34 078 degrees     QTc Int : 477 ms    Normal sinus rhythm  Left axis deviation  Nonspecific ST and T wave abnormality  Prolonged QT  Abnormal ECG  When compared with ECG of 17-AUG-2016 02:13,  No significant change was found  Confirmed by DAKOTA MUHAMMAD MD ([id number]) on 3/6/2017 6:11:52 PM    Referred By:  JB           Confirmed By:DAKOTA MUHAMMAD MD                  [facility]    Final diagnoses:   Acute on chronic respiratory failure with hypoxia and hypercapnia   COPD exacerbation   Shortness of breath   Weakness   Confusion   Hypokalemia   Scabies       Documentation assistance provided by kishan Medrano.  " Information recorded by the scribe was done at my direction and has been verified and validated by me.     Rosemary Medrano  03/06/17 1553       Rosemary Medrano  03/06/17 1839       Olman Chapman MD  03/06/17 1930

## 2017-03-07 NOTE — PROGRESS NOTES
Hospitalist Daily Progress Note    Date of Admission: 3/6/2017   LOS: 1 day   PCP: No Known Provider    Chief Complaint:  F/u fatigue    Subjective   History of Present Illness  Gen. Weakness and confusion improving.  SOA improving.    Review of Systems  General: no fevers, chills  Respiratory: no cough.  Cardiovascular: no chest pain, palpitations  Abdomen: No abdominal pain, nausea, vomiting, or diarrhea  Neurologic: No focal weakness, has gen. Weakness  All other systems reviewed and negative.    Objective   Physical Exam:  I have reviewed the vital signs.  Temp:  [97.7 °F (36.5 °C)-97.9 °F (36.6 °C)] 97.7 °F (36.5 °C)  Heart Rate:  [] 102  Resp:  [15-20] 16  BP: ()/(57-88) 121/80    Objective  General Appearance:    Alert, cooperative, no distress  Head:    Normocephalic, atraumatic  Eyes:    PERRL EOMI, Sclerae anicteric  Neck:   Supple, no mass  Lungs: mild wheezing, respirations unlabored  Heart: Tachycardic, Regular rhythm, S1 and S2 normal, no murmur, rub or gallop  Abdomen:  Soft, non-tender, bowel sounds active, nondistended  Extremities: No clubbing, cyanosis, or trace edema to lower extremities  Pulses:  2+ and symmetric in distal lower extremities  Skin: No rashes no jaundice  Neurologic: Oriented x3, CN 2-12 intact, gen. Weakness.    Results Review:    I have reviewed the labs, radiology results and diagnostic studies.      Results from last 7 days  Lab Units 03/07/17  0559   WBC 10*3/mm3 8.37   HEMOGLOBIN g/dL 14.4   PLATELETS 10*3/mm3 248       Results from last 7 days  Lab Units 03/07/17  0559   SODIUM mmol/L 149*   POTASSIUM mmol/L 3.4*   TOTAL CO2 mmol/L 38.0*   CREATININE mg/dL 0.80   GLUCOSE mg/dL 105*       I have reviewed the medications.    ---------------------------------------------------------------------------------------------  Assessment/Plan   Assessment & Plan  Assessment/Problem List    Principal Problem:    Acute on chronic respiratory failure with hypoxia and  hypercapnia  Active Problems:    Hypothyroid    COPD exacerbation    Hypertension    Fatigue    Scabies       Plan  Acute on Chronic Respiratory Failure with hypoxia and hypercapnia  -normally on bipap at night and O2NC during day with history of COPD  -seems to be mainly due to COPD exacerbation, although a mild component of CHF could also be playing a role.  -CXR with cardiomegaly, stable chronic changes seen within the lung fields with no evidence of acute parenchymal disease.  -Blood Cx NGTD  -continue Brovana nebs, Daliresp, Symbicort.  -IV Solumedrol d/c'd and now started prednisone 40 mg po daily  -IV doxycycline d/c'd, now changed to PO  -DuoNeb q6h  -Off BiPAP, Currently on 6L NC Sats >90%.    Diastolic CHF  -troponin negative, , EKG LAD, NSR, no acute ST changes, CXR with cardiomegaly.  -ECHO from July 2016 with:  · Left ventricular function is normal. Estimated EF = 50%.  · Moderate tricuspid valve regurgitation is present.  · Moderate mitral valve regurgitation is present  · Right ventricular cavity is mild-to-moderately dilated.  · Moderate aortic valve regurgitation is present.  · Mild aortic valve stenosis is present.  -continue Sotalol, lasix, Imdur.    Hypothyroidism   -continue synthroid.    Gen Weakness  -normally able to walk around with a walker  -PT/OT    Scabies  -permethrin    Hypokalemia  -replaced PO.    Left leg pain  -Left femur and tib/fib films with NO fx or dislocation.    Palliative care involved.  Code Status:  AND/DNR    DVT prophylaxis:   heparin  Discharge Planning: I expect patient to be discharged to home with HH/PT in 1-2 days.    Lucas Nails MD 03/07/17 12:55 PM

## 2017-03-07 NOTE — PLAN OF CARE
Problem: Patient Care Overview (Adult)  Goal: Plan of Care Review  Outcome: Ongoing (interventions implemented as appropriate)    03/07/17 1313   Coping/Psychosocial Response Interventions   Plan Of Care Reviewed With patient;family   Patient Care Overview   Progress no change   Outcome Evaluation   Outcome Summary/Follow up Plan new palliative consult. continue current plan of care. symptom management/goc

## 2017-03-07 NOTE — PLAN OF CARE
Problem: Respiratory Insufficiency (Adult)  Goal: Identify Related Risk Factors and Signs and Symptoms  Outcome: Ongoing (interventions implemented as appropriate)  Goal: Acid/Base Balance  Outcome: Ongoing (interventions implemented as appropriate)  Goal: Effective Ventilation  Outcome: Ongoing (interventions implemented as appropriate)    Problem: Fall Risk (Adult)  Goal: Identify Related Risk Factors and Signs and Symptoms  Outcome: Ongoing (interventions implemented as appropriate)  Goal: Absence of Falls  Outcome: Ongoing (interventions implemented as appropriate)    Problem: Skin Integrity Impairment, Risk/Actual (Adult)  Goal: Identify Related Risk Factors and Signs and Symptoms  Outcome: Ongoing (interventions implemented as appropriate)  Goal: Skin Integrity/Wound Healing  Outcome: Ongoing (interventions implemented as appropriate)    Problem: Patient Care Overview (Adult)  Goal: Plan of Care Review  Outcome: Ongoing (interventions implemented as appropriate)    03/07/17 1313 03/07/17 1315   Coping/Psychosocial Response Interventions   Plan Of Care Reviewed With patient;family --    Patient Care Overview   Progress --  progress towards functional goals is fair   Outcome Evaluation   Outcome Summary/Follow up Plan --  Pt. is A&O however has become restless this afternoon. Family is at bedside at this time. PT. denies pain and reports that her breathing is better today however pt. refuses to wear BIPAP at this time. After lunch will encourage pt. to rest with BIPAP in place. Mary on 6L/NC. Itching has improved after treatment was given for Scabies on 7pm shift. Continues to have genralized weakness requiring assist of 2 for transfer.       Goal: Adult Individualization and Mutuality  Outcome: Ongoing (interventions implemented as appropriate)

## 2017-03-07 NOTE — CONSULTS
No Known Provider  Consulting physician: Tuan    Chief Complaint   Patient presents with   • Fatigue   • Other     scabies         HPI: Patient came in hospital with dyspnea as well as fatigue.  Patient does have a history of COPD and currently uses CPAP at home.  Patient states that today her breathing has been doing better and she feels back to normal in respect to her respiratory status.  Otherwise patient has no significant complaints at this point time.      Past Medical History   Diagnosis Date   • Anemia    • Anxiety    • Arthritis    • Asthma    • Atrial fibrillation    • Cellulitis    • Chronic pain    • Chronic respiratory failure    • COPD (chronic obstructive pulmonary disease)    • Coronary artery disease    • Depression    • Diastolic congestive heart failure    • Dyslipidemia    • GERD (gastroesophageal reflux disease)    • H/O fracture of rib    • History of MRSA infection of lungs      pneumonia   • History of small bowel obstruction    • Hyperlipidemia    • Hypertension    • Hypothyroid    • Pneumonia    • Restless leg syndrome    • S/P coronary artery stent placement    • Scabies      Past Surgical History   Procedure Laterality Date   • Hand surgery     • Tonsillectomy     • Coronary stent placement       5   • Cardiac catheterization     • Chest tube insertion       Current Facility-Administered Medications   Medication Dose Route Frequency Provider Last Rate Last Dose   • doxycycline (VIBRAMYCIN) 100 mg/100 mL 0.9% NS MBP  100 mg Intravenous Q12H Mary M Tuan II, DO 0 mL/hr at 03/07/17 0134 100 mg at 03/07/17 0943   • heparin (porcine) 5000 UNIT/ML injection 5,000 Units  5,000 Units Subcutaneous Q12H Mary M Tuan II, DO   5,000 Units at 03/07/17 0942   • ipratropium-albuterol (DUO-NEB) nebulizer solution 3 mL  3 mL Nebulization Once Olman Chapman MD       • ipratropium-albuterol (DUO-NEB) nebulizer solution 3 mL  3 mL Nebulization Q6H - RT Mary M Tuan II, DO   3 mL at 03/07/17  "0713   • methylPREDNISolone sodium succinate (SOLU-Medrol) injection 60 mg  60 mg Intravenous Q12H Mary Dickerson II, DO   60 mg at 03/07/17 0620   • Pharmacy Consult - MTM   Does not apply Daily Jonnathan Duncan [facility]       • sodium chloride 0.9 % flush 10 mL  10 mL Intravenous PRN Olman Chapman MD            sodium chloride  Allergies   Allergen Reactions   • Altace [Ramipril]    • Penicillins Other (See Comments), Rash and Swelling     Family History   Problem Relation Age of Onset   • Cancer Mother      Pancreatic Cancer in her 50's   • Heart disease Father    • Depression Maternal Uncle    • Heart disease Other    • Cancer Other    • Alzheimer's disease Other    • Pneumonia Other    • Hyperlipidemia Other    • Diabetes Other    • Multiple sclerosis Other      Social History     Social History   • Marital status:      Spouse name: N/A   • Number of children: 12   • Years of education: N/A     Occupational History   • Not on file.     Social History Main Topics   • Smoking status: Former Smoker     Years: 15.00   • Smokeless tobacco: Former User     Quit date: 2014      Comment: quit 100% 1 year ago   • Alcohol use 0.6 - 1.2 oz/week     1 - 2 Glasses of wine per week      Comment: every couple of months   • Drug use: No   • Sexual activity: Defer     Other Topics Concern   • Not on file     Social History Narrative    Currently a resident at [facility], but typically lives with youngest daughter     Review of Systems - all others reviewed and found negative except as mentioned in history of present illness      Visit Vitals   • /80   • Pulse 102   • Temp 97.7 °F (36.5 °C) (Oral)   • Resp 16   • Ht 63\" (160 cm)   • Wt 125 lb 6 oz (56.9 kg)   • SpO2 93%   • BMI 22.21 kg/m2       Intake/Output Summary (Last 24 hours) at 03/07/17 1127  Last data filed at 03/07/17 0942   Gross per 24 hour   Intake    100 ml   Output      0 ml   Net    100 ml     Physical Exam:      General Appearance:    " Alert, cooperative, in no acute distress, frail appearing    Head:    Normocephalic, without obvious abnormality, atraumatic   Eyes:            Lids and lashes normal, conjunctivae and sclerae normal, no   icterus, no pallor, corneas clear, PERRLA   Ears:    Ears appear intact with no abnormalities noted   Throat:   No oral lesions, no thrush, oral mucosa moist   Neck:   No adenopathy, supple, trachea midline, no thyromegaly, no     carotid bruit, no JVD   Back:     No kyphosis present, no scoliosis present, no skin lesions,       erythema or scars, no tenderness to percussion or                   palpation,   range of motion normal   Lungs:     slight wheezing noted throughout all lung fields     Heart:    Regular rhythm and normal rate, normal S1 and S2, no            murmur, no gallop, no rub, no click   Breast Exam:    Deferred   Abdomen:     Normal bowel sounds, no masses, no organomegaly, soft        non-tender, non-distended, no guarding, no rebound                 tenderness   Genitalia:    Deferred   Extremities:   Moves all extremities well, no edema, no cyanosis, no              redness   Pulses:   Pulses palpable and equal bilaterally   Skin:   No bleeding, bruising or rash   Lymph nodes:   No palpable adenopathy   Neurologic:   Cranial nerves 2 - 12 grossly intact, sensation intact, DTR        present and equal bilaterally         Results from last 7 days  Lab Units 03/07/17  0559   WBC 10*3/mm3 8.37   HEMOGLOBIN g/dL 14.4   HEMATOCRIT % 46.3*   PLATELETS 10*3/mm3 248       Results from last 7 days  Lab Units 03/07/17  0559 03/06/17  1711   SODIUM mmol/L 149* 146   POTASSIUM mmol/L 3.4* 3.0*   CHLORIDE mmol/L 101 100   TOTAL CO2 mmol/L 38.0* 36.0*   BUN mg/dL 29* 26*   CREATININE mg/dL 0.80 0.80   CALCIUM mg/dL 9.5 9.3   BILIRUBIN mg/dL  --  0.4   ALK PHOS U/L  --  97   ALT (SGPT) U/L  --  15   AST (SGOT) U/L  --  19   GLUCOSE mg/dL 105* 174*       Results from last 7 days  Lab Units 03/07/17  0559    SODIUM mmol/L 149*   POTASSIUM mmol/L 3.4*   CHLORIDE mmol/L 101   TOTAL CO2 mmol/L 38.0*   BUN mg/dL 29*   CREATININE mg/dL 0.80   GLUCOSE mg/dL 105*   CALCIUM mg/dL 9.5     Imaging Results (last 72 hours)     Procedure Component Value Units Date/Time    XR Chest 1 View [[id number]] Collected:  03/06/17 1649     Updated:  03/06/17 1649    Narrative:       EXAMINATION: XR CHEST 1 VIEW-03/06/2017:      INDICATION: CHF/COPD protocol.      COMPARISON: 08/17/2015.     FINDINGS: Portable chest reveals the heart to be enlarged. Vascular  calcification seen within the thoracic aorta. Underlying chronic changes  seen within the lung fields bilaterally. Nodules seen within the right  upper lobe and periphery. Degenerative changes seen within the spine.   No pleural effusion or pneumothorax. Pulmonary vascularity is within  normal limits.           Impression:       Stable chronic changes seen within the lung fields with no  evidence of acute parenchymal disease.     D:  03/06/2017  E:  03/06/2017             XR Tibia Fibula 2 View Left [[id number]] Collected:  03/07/17 0853     Updated:  03/07/17 0853    Narrative:       EXAMINATION: XR TIBIA AND FIBULA 2 VIEWS, LEFT-03/06/2017:      INDICATION: Pain.      COMPARISON: NONE.     FINDINGS: Two views of left tibia and fibula reveal osteopenia  identified of the bony structures. There is moderate degenerative  changes seen within the left knee. Calcification seen of the menisci.  Vascular calcifications are present. Cortex is intact. No fracture or  dislocation.          Impression:       . Osteopenia identified of the bony structures with no  evidence of fracture or dislocation.     D:  03/06/2017  E:  03/07/2017             XR Femur 2 View Left [[id number]] Collected:  03/07/17 0853     Updated:  03/07/17 0853    Narrative:       EXAMINATION: XR FEMUR 2 VW LEFT- 03/06/2017     INDICATION: pain      COMPARISON: NONE     FINDINGS: Changes of the left knee reveal severe osteopenia of  the bony  structures. Moderate degenerative changes seen within the knee. Vascular  calcifications identified. No joint space narrowing. Cortex is intact.  No fracture or dislocation.           Impression:       Bony structures are unremarkable with no evidence of acute  fracture or dislocation. Severe osteopenia of the bony structures with  degenerative changes seen within the joint.     D:  03/06/2017  E:  03/07/2017              Impression: COPD exacerbation  Dyspnea  Fatigue  Debility  Goals of care  Plan: At this point time the patient appears to be doing better in respect to her symptoms.  We'll continue current symptom management regimen.  We'll follow the patient and have further discussions as needed however we'll most likely continue home health.        Morgan Weston DO  03/07/17  11:27 AM

## 2017-03-07 NOTE — PROGRESS NOTES
Discharge Planning Assessment  [facility]     Patient Name: Pattie Dey  MRN: [id number]  Today's Date: 3/7/2017    Admit Date: 3/6/2017          Discharge Needs Assessment       03/07/17 1118    Living Environment    Lives With child(anitra), adult   Pt resides in [facility] with daughters Arianna and Rea    Living Arrangements house    Provides Primary Care For no one, unable/limited ability to care for self    Primary Care Provided By child(anitra) (specify)    Quality Of Family Relationships supportive;helpful;involved    Able to Return to Prior Living Arrangements yes    Discharge Needs Assessment    Concerns To Be Addressed denies needs/concerns at this time;no discharge needs identified    Readmission Within The Last 30 Days no previous admission in last 30 days    Outpatient/Agency/Support Group Needs homecare agency (specify level of care)    Community Agency Name(S) Pt has [facility] services for dx of COPD and receives skilled nursing, PT/OT and aide.     Equipment Currently Used at Home commode;lift device;oxygen;power chair, (recliner lift);raised toilet;respiratory supplies;shower chair;walker, rolling;wheelchair;other (see comments)   Pt has Oxygen and a Bipap provided by [employer]. Pt also has a rollator    Equipment Needed After Discharge none    Transportation Available car;family or friend will provide    Discharge Contact Information if Applicable home- [phone] or Arianna Dunham [facility]- [phone]            Discharge Plan       03/07/17 1126    Case Management/Social Work Plan    Plan home and resume home health    Patient/Family In Agreement With Plan yes    Additional Comments Spoke with pt and son, Dilshad at bedside. Pt plans to return home with families assistance and resume home health services through [facility]. Pt denies skilled rehab services at this time. CM will continue to follow.         Discharge Placement     No information found        Expected Discharge Date and Time      Expected Discharge Date Expected Discharge Time    Mar 9, 2017               Demographic Summary       03/07/17 1116    Referral Information    Referral Source admission list    Contact Information    Permission Granted to Share Information With     Primary Care Physician Information    Name [email or web address]            Functional Status       03/07/17 1117    Functional Status Current    Current Functional Level Comment see previous charting    Functional Status Prior    Ambulation 1-->assistive equipment    Transferring 1-->assistive equipment    Toileting 1-->assistive equipment    Bathing 3-->assistive equipment and person    Dressing 2-->assistive person    Eating 0-->independent    Communication 0-->understands/communicates without difficulty    Swallowing 0-->swallows foods/liquids without difficulty    IADL    Medications independent   pt confirms she has prescription drug coverage through [employer]. Pt denies issues obtaining or affording medications    Meal Preparation completely dependent    Housekeeping completely dependent    Laundry completely dependent    Shopping completely dependent    Oral Care independent    Activity Tolerance    Usual Activity Tolerance fair    Current Activity Tolerance fair    Employment/Financial    Financial Concerns none   pt confirms she has Medicare and [employer] insurance, denies concerns or disruption in coverage            Psychosocial     None            Abuse/Neglect     None            Legal     None            Substance Abuse     None            Patient Forms     None          Becka Barraza

## 2017-03-07 NOTE — PLAN OF CARE
Problem: Patient Care Overview (Adult)  Goal: Plan of Care Review  Outcome: Ongoing (interventions implemented as appropriate)    03/07/17 1010   Coping/Psychosocial Response Interventions   Plan Of Care Reviewed With patient;son   Patient Care Overview   Progress progress towards functional goals is fair   Outcome Evaluation   Outcome Summary/Follow up Plan Adm w/ resp fail/hypox & MRSA/Scabies; presents w/ evolving sympt,incl. signif desat w/ activ. (to 80% w/ gt on 6 L), fatigue/weakness, & fall risk (reports several falls P.T.A.); Will benefit from IPPT for strengthening, prog mobil. trng & instruct in fall prevention; recommend resuming HHPT at d/c to ret. to PLOF         Problem: Inpatient Physical Therapy  Goal: Bed Mobility Goal LTG- PT  Outcome: Ongoing (interventions implemented as appropriate)    03/07/17 1010   Bed Mobility PT LTG   Bed Mobility PT LTG, Date Established 03/07/17   Bed Mobility PT LTG, Time to Achieve 5 days   Bed Mobility PT LTG, Activity Type all bed mobility   Bed Mobility PT LTG, [place] Level moderate assist (50% patient effort)       Goal: Transfer Training Goal 1 LTG- PT  Outcome: Ongoing (interventions implemented as appropriate)    03/07/17 1010   Transfer Training PT LTG   Transfer Training PT LTG, Date Established 03/07/17   Transfer Training PT LTG, Time to Achieve 5 days   Transfer Training PT LTG, Activity Type bed to chair /chair to bed;sit to stand/stand to sit   Transfer Training PT LTG, [place] Level minimum assist (75% patient effort);2 person assist required   Transfer Training PT LTG, Assist Device walker, rolling       Goal: Gait Training Goal LTG- PT  Outcome: Ongoing (interventions implemented as appropriate)    03/07/17 1010   Gait Training PT LTG   Gait Training Goal PT LTG, Date Established 03/07/17   Gait Training Goal PT LTG, Time to Achieve 5 days   Gait Training Goal PT LTG, [place] Level moderate assist (50% patient effort)   Gait Training  Goal PT LTG, Assist Device walker, rolling   Gait Training Goal PT LTG, Distance to Achieve 25       Goal: Stair Training Goal LTG- PT  Outcome: Ongoing (interventions implemented as appropriate)    03/07/17 1010   Stair Training PT LTG   Stair Training Goal PT LTG, Date Established 03/07/17   Stair Training Goal PT LTG, Time to Achieve 5 days   Stair Training Goal PT LTG, Number of Steps 2   Stair Training Goal PT LTG, [place] Level moderate assist (50% patient effort);2 person assist required   Stair Training Goal PT LTG, Assist Device 1 handrail

## 2017-03-07 NOTE — PLAN OF CARE
Problem: Respiratory Insufficiency (Adult)  Goal: Identify Related Risk Factors and Signs and Symptoms  Outcome: Ongoing (interventions implemented as appropriate)  Goal: Acid/Base Balance  Outcome: Ongoing (interventions implemented as appropriate)  Goal: Effective Ventilation  Outcome: Ongoing (interventions implemented as appropriate)    Problem: Fall Risk (Adult)  Goal: Identify Related Risk Factors and Signs and Symptoms  Outcome: Ongoing (interventions implemented as appropriate)  Goal: Absence of Falls  Outcome: Ongoing (interventions implemented as appropriate)    Problem: Skin Integrity Impairment, Risk/Actual (Adult)  Goal: Identify Related Risk Factors and Signs and Symptoms  Outcome: Ongoing (interventions implemented as appropriate)  Goal: Skin Integrity/Wound Healing  Outcome: Ongoing (interventions implemented as appropriate)    Problem: Patient Care Overview (Adult)  Goal: Plan of Care Review  Outcome: Ongoing (interventions implemented as appropriate)    03/07/17 0631   Coping/Psychosocial Response Interventions   Plan Of Care Reviewed With patient   Patient Care Overview   Progress no change   Outcome Evaluation   Outcome Summary/Follow up Plan Pt new admit this shift; scabies treatment performed. Pt restless overnight from itching and inability to tolerate BiPap

## 2017-03-07 NOTE — PROGRESS NOTES
Acute Care - Physical Therapy Initial Evaluation  [facility]     Patient Name: Pattie Dey  : 1924  MRN: [id number]  Today's Date: 3/7/2017   Onset of Illness/Injury or Date of Surgery Date: 17  Date of Referral to PT: 17  Referring Physician: ISAI Dickerson DO      Admit Date: 3/6/2017     Visit Dx:    ICD-10-CM ICD-9-CM   1. Acute on chronic respiratory failure with hypoxia and hypercapnia J96.21 518.84    J96.22 786.09     799.02   2. COPD exacerbation J44.1 491.21   3. Shortness of breath R06.02 786.05   4. Weakness R53.1 780.79   5. Confusion R41.0 298.9   6. Hypokalemia E87.6 276.8   7. Scabies B86 133.0   8. Impaired functional mobility, balance, gait, and endurance Z74.09 V49.89     Patient Active Problem List   Diagnosis   • Pneumonia   • Coronary artery disease   • Diastolic congestive heart failure   • Hypothyroid   • Dyslipidemia   • Restless leg syndrome   • Atrial fibrillation   • NSTEMI (non-ST elevated myocardial infarction)   • Acute on chronic respiratory failure with hypoxia and hypercapnia   • COPD exacerbation   • Sepsis   • Nodule of right lung   • LUE Cellulitis   • Altered mental state   • Hypertension   • Small bowel obstruction   • Carotid bruit   • Anemia   • Fatigue   • Scabies     Past Medical History   Diagnosis Date   • Anemia    • Anxiety    • Arthritis    • Asthma    • Atrial fibrillation    • Cellulitis    • Chronic pain    • Chronic respiratory failure    • COPD (chronic obstructive pulmonary disease)    • Coronary artery disease    • Depression    • Diastolic congestive heart failure    • Dyslipidemia    • GERD (gastroesophageal reflux disease)    • H/O fracture of rib    • History of MRSA infection of lungs      pneumonia   • History of small bowel obstruction    • Hyperlipidemia    • Hypertension    • Hypothyroid    • Pneumonia    • Restless leg syndrome    • S/P coronary artery stent placement    • Scabies      Past Surgical History   Procedure Laterality Date    • Hand surgery     • Tonsillectomy     • Coronary stent placement       5   • Cardiac catheterization     • Chest tube insertion            PT ASSESSMENT (last 72 hours)      PT Evaluation       03/07/17 0825 03/06/17 2000    Rehab Evaluation    Document Type evaluation  -DM     Subjective Information agree to therapy  -DM     Patient Effort, Rehab Treatment good  -DM     Symptoms Noted During/After Treatment fatigue;shortness of breath;significant change in vital signs  -DM     Symptoms Noted Comment drowsy;dozed off during rx;son gave home info;PA assessed; iv leaking;awaited nsg to fix;hep locked;nsg will start new IV;p.t. had nsg order soft diet after pt attempted breakfast tray/unable(teeth missing)  -DM     General Information    Patient Profile Review yes  -DM     Onset of Illness/Injury or Date of Surgery Date 03/06/17  -DM     Referring Physician ISAI Dickerson,   -DM     General Observations power's in bed;tele;02;iv leaking;mult bruises/echymotic areas over all 4 extrem;   -DM     Pertinent History Of Current Problem onset SOB, LUE cellulitis; funct decline x 1 wk(marked decline past day); ref ABG; To ER w/ slurred speech, decr.o2 sats;Neb rx x 2; BIPAP d/t elev CO2;Sat. incr to 97%; adm. for observation  -DM     Precautions/Limitations fall precautions;other (see comments);oxygen therapy device and L/min   per pt,has fallen approx 5 times(prev fx ribs,punct lung)  -DM     Prior Level of Function min assist:;all household mobility;ADL's;dependent:;home management;cooking;driving;cleaning;shopping   past week,min asst for mobil & ADL'S (Pvt duty aide)  -DM     Equipment Currently Used at Home commode;shower chair;raised toilet;oxygen;lift device;walker, rolling;wheelchair   has both rollator (dislikes)&Rwx,lift chair,2 commodes  -DM walker, rolling;lift device   lift chair   -TS    Plans/Goals Discussed With patient and family;agreed upon  -DM     Risks Reviewed patient and family:;LOB;dizziness;increased  discomfort;change in vital signs;lines disloged  -DM     Benefits Reviewed patient and family:;improve function;increase independence;increase strength;increase balance;decrease pain;increase knowledge  -DM     Barriers to Rehab medically complex;previous functional deficit  -DM     Living Environment    Lives With child(anitra), adult   2 dtrs  -DM child(anitra), adult   daughters jonathan and neptali  -TS    Living Arrangements house  -DM house  -TS    Home Accessibility stairs to enter home;bed and bath on same level  -DM stairs to enter home   2  -TS    Number of Stairs to Enter Home 2  -DM 2  -TS    Stair Railings at Home outside, present on right side  -DM outside, present on right side  -TS    Type of Financial/Environmental Concern none  -DM none  -TS    Transportation Available car;family or friend will provide  -DM car  -TS    Living Environment Comment pvt. aide hired for ADL'S 2 X/WK (HHPT/OT d/c'd)  -DM     Clinical Impression    Date of Referral to PT 03/07/17  -DM     PT Diagnosis impaired funct mobil  -DM     Criteria for Skilled Therapeutic Interventions Met yes;treatment indicated  -DM     Pathology/Pathophysiology Noted (Describe Specifically for Each System) pulmonary  -DM     Impairments Found (describe specific impairments) gait, locomotion, and balance  -DM     Rehab Potential good, to achieve stated therapy goals  -DM     Vital Signs    Pre Systolic BP Rehab 132  -DM     Pre Treatment Diastolic BP 88  -DM     Post Systolic BP Rehab 121  -DM     Post Treatment Diastolic BP 80  -DM     Pretreatment Heart Rate (beats/min) 103  -DM     Intratreatment Heart Rate (beats/min) 109  -DM     Posttreatment Heart Rate (beats/min) 102  -DM     Pre SpO2 (%) 94  -DM     O2 Delivery Pre Treatment supplemental O2   6 L  -DM     Intra SpO2 (%) 80  -DM     O2 Delivery Intra Treatment supplemental O2  -DM     Post SpO2 (%) 93  -DM     O2 Delivery Post Treatment supplemental O2  -DM     Pre Patient Position Supine  -DM      "Intra Patient Position Standing  -DM     Post Patient Position Sitting  -DM     Pain Assessment    Pain Assessment Atkinson-Dempsey FACES  -DM     Atkinson-Dempsey FACES Pain Rating 4  -DM     Pain Type Acute pain  -DM     Pain Location Generalized  -DM     Pain Orientation Other (Comment)   \"OPEN wounds on my back, & post. knees hurt\"  -DM     Pain Descriptors Aching  -DM     Pain Frequency Constant/continuous  -DM     Patient's Stated Pain Goal No pain  -DM     Pain Intervention(s) Medication (See MAR);Repositioned;Ambulation/increased activity  -DM     Cognitive Assessment/Intervention    Current Cognitive/Communication Assessment functional  -DM     Orientation Status oriented x 4  -DM     Follows Commands/Answers Questions 100% of the time;able to follow single-step instructions;needs cueing;needs increased time;needs repetition  -DM     Personal Safety mild impairment;decreased awareness, need for assist;decreased awareness, need for safety  -DM     Personal Safety Interventions elopement precautions initiated;fall prevention program maintained;gait belt;nonskid shoes/slippers when out of bed  -DM     ROM (Range of Motion)    General ROM lower extremity range of motion deficits identified;upper extremity range of motion deficits identified   B hip flex & abd limited 25 %; R shldr def. d/t RTC injury  -DM     MMT (Manual Muscle Testing)    General MMT Assessment lower extremity strength deficits identified;upper extremity strength deficits identified   R shldr limit.50-75% d/t RTC inj.; B hips/knees 4- to 4/5   -DM     Bed Mobility, Assessment/Treatment    Bed Mobility, Assistive Device head of bed elevated  -DM     Bed Mobility, Roll Right, [place] minimum assist (75% patient effort)  -DM     Bed Mob, Sidelying to Sit, [place] moderate assist (50% patient effort);verbal cues required   2 trials to come to full sit EOB  -DM     Bed Mobility, Safety Issues impaired trunk control for bed mobility   LOB post x 2; " noted IV leaking;nsg summoned;hep locked  -DM     Bed Mobility, Impairments strength decreased;ROM decreased;motor control impaired;pain   diffic pushing up from R side lying d/t R RTC injury  -DM     Bed Mobility, Comment REQ.walking shoes(done);trunk ext. activ.;loaner wx fitted;  -DM     Transfer Assessment/Treatment    Transfers, Sit-Stand [place] maximum assist (25% patient effort);2 person assist required;verbal cues required   Bfeet blocked to prevent sliding;req.30sec for full upright   -DM     Transfers, Stand-Sit [place] moderate assist (50% patient effort);2 person assist required;verbal cues required  -DM     Transfers, Sit-Stand-Sit, Assist Device rolling walker  -DM     Transfer, Safety Issues sequencing ability decreased;weight-shifting ability decreased;loses balance backward;knees buckling  -DM     Transfer, Impairments strength decreased;impaired balance;postural control impaired;pain   B knee instabil;poor trunk control;Vc's for trunk ext  -DM     Transfer, Comment req. walking shoes for adequate foot support/traction  -DM     Gait Assessment/Treatment    Gait, [place] Level 2 person assist required;verbal cues required;moderate assist (50% patient effort)   CONTcues for trunk ext;mod asst for A.D;counterpress.to ext  -DM     Gait, Assistive Device rolling walker   tech for IV, O2;MIN ASST TO GUIDE A.D  -DM     Gait, Distance (Feet) 10   to chair; incr. SOB;O2 SAT.decr. to 80% on 6L(02 ext.placed)  -DM     Gait, Gait Pattern Analysis swing-to gait  -DM     Gait, Gait Deviations keira decreased;decreased heel strike;forward flexed posture;knee buckling;limb motion velocity decreased;step length decreased  -DM     Gait, Safety Issues sequencing ability decreased;step length decreased;weight-shifting ability decreased;supplemental O2  -DM     Gait, Impairments ROM decreased;strength decreased;impaired balance;postural control impaired;pain  -DM     Gait, Comment pt needing  coaxing(son,P.T.)to amb to chair for meal  -DM     Motor Skills/Interventions    Additional Documentation Balance Skills Training (Group)  -DM     Balance Skills Training    Sitting-Level of Assistance Close supervision  -DM     Sitting-Balance Support Left upper extremity supported;Right upper extremity supported;Feet supported  -DM     Sitting-Balance Activities Lateral lean;Forward lean;Head control activities (Comment);Trunk control activities   scooting; LE exer;IV anchored;trunk ext;PLB Exer  -DM     Sitting # of Minutes 15  -DM     Standing-Level of Assistance Moderate assistance;x2  -DM     Static Standing Balance Support assistive device  -DM     Standing-Balance Activities Weight Shift A-P;Weight Shift R-L  -DM     Standing Balance # of Minutes 1  -DM     Gait Balance-Level of Assistance Moderate assistance;x2  -DM     Gait Balance Support assistive device  -DM     Gait Balance Activities side-stepping  -DM     Gait Balance # of Minutes 1  -DM     Therapy Exercises    Bilateral Lower Extremities AROM:;AAROM:;10 reps;sitting;ankle pumps/circles;heel slides;hip abduction/adduction;hip ER;hip flexion;hip IR;LAQ;quad sets   pillow squeezes  -DM     Bilateral Upper Extremity AROM:;10 reps;sitting;elbow flexion/extension   c/o fat.; agreed to do shldr exer later w/son(demo'd each)  -DM     Positioning and Restraints    Pre-Treatment Position in bed  -DM     Post Treatment Position chair  -DM     In Chair sitting;call light within reach;exit alarm on;with family/caregiver;RUE elevated;LUE elevated;legs elevated  -DM       User Key  (r) = Recorded By, (t) = Taken By, (c) = Cosigned By    Initials Name Provider Type    DM Sofiya Ron, PT Physical Therapist    TS Orquidea Nelson, RODNEY Registered Nurse          Physical Therapy Education     Title: PT OT SLP Therapies (Active)     Topic: Physical Therapy (Active)     Point: Mobility training (Active)    Learning Progress Summary    Learner Readiness Method Response  Comment Documented by Status   Patient Adelina KHANNA,D NR  DM 03/07/17 1010 Active   Family Eager E,D NR  DM 03/07/17 1010 Active               Point: Home exercise program (Active)    Learning Progress Summary    Learner Readiness Method Response Comment Documented by Status   Patient Adelina KHANNA,D NR  DM 03/07/17 1010 Active   Family Eager E,D NR  DM 03/07/17 1010 Active               Point: Body mechanics (Active)    Learning Progress Summary    Learner Readiness Method Response Comment Documented by Status   Patient Adelina KHANNA,D NR  DM 03/07/17 1010 Active   Family Eager E,D NR  DM 03/07/17 1010 Active               Point: Precautions (Active)    Learning Progress Summary    Learner Readiness Method Response Comment Documented by Status   Patient Adelina KHANNA,D NR  DM 03/07/17 1010 Active   Family Silvestreer E,D NR  DM 03/07/17 1010 Active                      User Key     Initials Effective Dates Name Provider Type Discipline     06/19/15 -  Sofiya Ron, PT Physical Therapist PT                PT Recommendation and Plan  Anticipated Discharge Disposition: home with home health  PT Frequency: daily  Plan of Care Review  Plan Of Care Reviewed With: patient, son  Progress: progress towards functional goals is fair  Outcome Summary/Follow up Plan: Adm w/ resp fail/hypox & MRSA/Scabies; presents w/ evolving sympt,incl. signif desat w/ activ. (to 80% w/ gt on 6 L), fatigue/weakness, & fall risk (reports several falls P.T.A.); Will benefit from IPPT for strengthening, prog mobil. trng & instruct in fall prevention; recommend resuming HHPT at d/c to ret. to PLOF          IP PT Goals       03/07/17 1010          Bed Mobility PT LTG    Bed Mobility PT LTG, Date Established 03/07/17  -DM      Bed Mobility PT LTG, Time to Achieve 5 days  -DM      Bed Mobility PT LTG, Activity Type all bed mobility  -DM      Bed Mobility PT LTG, [place] Level moderate assist (50% patient effort)  -DM      Transfer Training PT LTG    Transfer Training  PT LTG, Date Established 03/07/17  -DM      Transfer Training PT LTG, Time to Achieve 5 days  -DM      Transfer Training PT LTG, Activity Type bed to chair /chair to bed;sit to stand/stand to sit  -DM      Transfer Training PT LTG, [place] Level minimum assist (75% patient effort);2 person assist required  -DM      Transfer Training PT LTG, Assist Device walker, rolling  -DM      Gait Training PT LTG    Gait Training Goal PT LTG, Date Established 03/07/17  -DM      Gait Training Goal PT LTG, Time to Achieve 5 days  -DM      Gait Training Goal PT LTG, [place] Level moderate assist (50% patient effort)  -DM      Gait Training Goal PT LTG, Assist Device walker, rolling  -DM      Gait Training Goal PT LTG, Distance to Achieve 25  -DM      Stair Training PT LTG    Stair Training Goal PT LTG, Date Established 03/07/17  -DM      Stair Training Goal PT LTG, Time to Achieve 5 days  -DM      Stair Training Goal PT LTG, Number of Steps 2  -DM      Stair Training Goal PT LTG, [place] Level moderate assist (50% patient effort);2 person assist required  -DM      Stair Training Goal PT LTG, Assist Device 1 handrail  -DM        User Key  (r) = Recorded By, (t) = Taken By, (c) = Cosigned By    Initials Name Provider Type    DM Sofiya Ron, PT Physical Therapist                Outcome Measures       03/07/17 0825          How much help from another person do you currently need...    Turning from your back to your side while in flat bed without using bedrails? 2  -DM      Moving from lying on back to sitting on the side of a flat bed without bedrails? 2  -DM      Moving to and from a bed to a chair (including a wheelchair)? 2  -DM      Standing up from a chair using your arms (e.g., wheelchair, bedside chair)? 2  -DM      Climbing 3-5 steps with a railing? 1  -DM      To walk in hospital room? 2  -DM      AM-PAC 6 Clicks Score 11  -DM      Functional Assessment    Outcome Measure Options AM-PAC 6 Clicks Basic  Mobility (PT)  -DM        User Key  (r) = Recorded By, (t) = Taken By, (c) = Cosigned By    Initials Name Provider Type    DM Sofiya Ron, PT Physical Therapist           Time Calculation:         PT Charges       03/07/17 1010          Time Calculation    Start Time 0825  -DM      PT Received On 03/07/17  -DM      PT Goal Re-Cert Due Date 03/17/17  -DM      Time Calculation- PT    Total Timed Code Minutes- PT 45 minute(s)  -DM        User Key  (r) = Recorded By, (t) = Taken By, (c) = Cosigned By    Initials Name Provider Type    DM Sofiya Ron, PT Physical Therapist          Therapy Charges for Today     Code Description Service Date Service Provider Modifiers Qty    73242367571 HC PT MOBILITY CURRENT 3/7/2017 Sofiya Ron, PT GP, CL 1    04186222670 HC PT MOBILITY PROJECTED 3/7/2017 Sofiya Ron, PT GP, CL 1    43170308851 HC PT EVAL MOD COMPLEXITY 4 3/7/2017 Sofiya Ron, PT GP 1    17174117754 HC PT THER PROC EA 15 MIN 3/7/2017 Sofiya Ron, PT GP 2    83404491444 HC GAIT TRAINING EA 15 MIN 3/7/2017 Sofiya Ron, PT GP 1    72682230012 HC PT THER SUPP EA 15 MIN 3/7/2017 Sofiya Ron, PT GP 2          PT G-Codes  PT Professional Judgement Used?: Yes  Outcome Measure Options: AM-PAC 6 Clicks Basic Mobility (PT)  Score: 11  Functional Limitation: Mobility: Walking and moving around  Mobility: Walking and Moving Around Current Status (): At least 60 percent but less than 80 percent impaired, limited or restricted  Mobility: Walking and Moving Around Goal Status (): At least 60 percent but less than 80 percent impaired, limited or restricted      Sofiya Ron, PT  3/7/2017

## 2017-03-08 NOTE — PROGRESS NOTES
Continued Stay Note  [facility]     Patient Name: Pattie Dey  MRN: [id number]  Today's Date: 3/8/2017    Admit Date: 3/6/2017          Discharge Plan       03/08/17 1348    Case Management/Social Work Plan    Plan skilled rehab    Additional Comments Pt has changed her mind and now would like to go to rehab prior to returning home. Pt worked with physical therapy yesterday and did not feel she would be able to return home needing so much assistance. Pt's daughter also at bedside and reported pt is now requiring assistance of 2 people for ambulation and also feels pt would benefit from skilled rehab prior to returning home. Pt would like referral  to [facility], attempted to call Romana Schmidt and left message regarding referral. CM will continue to follow.              Discharge Codes     None        Expected Discharge Date and Time     Expected Discharge Date Expected Discharge Time    Mar 9, 2017             Becka Barraza

## 2017-03-08 NOTE — PLAN OF CARE
Problem: Patient Care Overview (Adult)  Goal: Plan of Care Review  Outcome: Ongoing (interventions implemented as appropriate)    03/08/17 1389   Coping/Psychosocial Response Interventions   Plan Of Care Reviewed With patient;daughter   Patient Care Overview   Progress no change   Outcome Evaluation   Outcome Summary/Follow up Plan continue supportive care/ working with therapy.

## 2017-03-08 NOTE — PROGRESS NOTES
[facility] Medicine Services  INPATIENT PROGRESS NOTE    Date of Admission: 3/6/2017  Length of Stay: 1  Primary Care Physician: No Known Provider    Subjective   CC:  F/u fatigue  HPI:  Patient is lying in bed eating breakfast when seen this morning.  No new complaints or issues.  She states her breathing has improved and no itching noted.  Her family is at bedside.  Daughter concerned about her weakness stating that she requires assist x 2 to get up out of bed.  Asking about possible rehab placement.      Review Of Systems:   Review of Systems  Gen-no fevers, chills, + weakness  CV-no chest pain, palpitations  Resp-no cough, dyspnea  GI-no N/V/D, abd pain    Objective      Temp:  [96.5 °F (35.8 °C)-97.9 °F (36.6 °C)] 96.5 °F (35.8 °C)  Heart Rate:  [] 97  Resp:  [11-18] 14  BP: (112-136)/() 128/74  Physical Exam  Gen-no acute distress, sitting up in bed  CV-RRR, S1 S2 normal, no m/r/g  Resp-CTAB, no wheezes, diminished throughout   Abd-soft, NT, ND, +BS  Ext-no edema  Neuro-A&Ox3, no focal deficits  Psych-appropriate mood    Results Review:    I have reviewed the labs, radiology results and diagnostic studies.      Results from last 7 days  Lab Units 03/07/17  0559   WBC 10*3/mm3 8.37   HEMOGLOBIN g/dL 14.4   PLATELETS 10*3/mm3 248       Results from last 7 days  Lab Units 03/08/17  0828   SODIUM mmol/L 142   POTASSIUM mmol/L 3.7   CHLORIDE mmol/L 101   TOTAL CO2 mmol/L 36.0*   BUN mg/dL 25*   CREATININE mg/dL 0.70   GLUCOSE mg/dL 113*   CALCIUM mg/dL 9.2       Culture Data: Cultures:    BLOOD CULTURE   Date Value Ref Range Status   03/06/2017 No growth at 24 hours  Preliminary   03/06/2017 No growth at 24 hours  Preliminary     URINE CULTURE   Date Value Ref Range Status   03/07/2017 Culture in progress  Preliminary       Radiology Data:     I have reviewed the medications.    Current Facility-Administered Medications:   •  amitriptyline (ELAVIL) tablet 50 mg, 50 mg, Oral,  Nightly, Lucas Nails MD, 50 mg at 03/07/17 2007  •  aspirin chewable tablet 81 mg, 81 mg, Oral, Daily, Lucas Nails MD, 81 mg at 03/08/17 0903  •  budesonide-formoterol (SYMBICORT) 160-4.5 MCG/ACT inhaler 2 puff, 2 puff, Inhalation, BID - RT, Lucas Nails MD, 2 puff at 03/08/17 0759  •  doxycycline (VIBRAMYCIN) capsule 100 mg, 100 mg, Oral, Q12H, Lucas Nails MD, 100 mg at 03/08/17 0602  •  ferrous sulfate tablet 325 mg, 325 mg, Oral, Daily, Lucas Nails MD, 325 mg at 03/08/17 0903  •  fluticasone (FLONASE) 50 MCG/ACT nasal spray 2 spray, 2 spray, Nasal, Daily, Lucas Nails MD, 2 spray at 03/08/17 0901  •  furosemide (LASIX) tablet 40 mg, 40 mg, Oral, Daily, Lucas Nails MD, 40 mg at 03/08/17 0904  •  gabapentin (NEURONTIN) capsule 600 mg, 600 mg, Oral, Q12H, Lucas Nails MD, 600 mg at 03/08/17 0904  •  guaiFENesin (MUCINEX) 12 hr tablet 600 mg, 600 mg, Oral, Q12H, Lucas Nails MD, 600 mg at 03/08/17 0903  •  heparin (porcine) 5000 UNIT/ML injection 5,000 Units, 5,000 Units, Subcutaneous, Q12H, Mary Dickerson II, DO, 5,000 Units at 03/08/17 0904  •  hydrOXYzine (ATARAX) tablet 25 mg, 25 mg, Oral, Q8H PRN, Lucas Nails MD  •  ipratropium-albuterol (DUO-NEB) nebulizer solution 3 mL, 3 mL, Nebulization, Once, Olman Chapman MD  •  ipratropium-albuterol (DUO-NEB) nebulizer solution 3 mL, 3 mL, Nebulization, Q6H - RT, Mary Dickerson II, DO, 3 mL at 03/08/17 1335  •  isosorbide mononitrate (IMDUR) 24 hr tablet 30 mg, 30 mg, Oral, Q24H, Lucas Nails MD, 30 mg at 03/08/17 0904  •  levothyroxine (SYNTHROID, LEVOTHROID) tablet 25 mcg, 25 mcg, Oral, Q AM, Lucas Nails MD, 25 mcg at 03/08/17 0602  •  montelukast (SINGULAIR) tablet 10 mg, 10 mg, Oral, Daily, Lucas Nails MD, 10 mg at 03/08/17 0904  •  multivitamin with minerals 1 tablet, 1 tablet, Oral, Daily, Lucas Nails MD, 1 tablet at 03/08/17 0904  •  Pharmacy Consult -  MTM, , Does not apply, Daily, Jonnathan Duncan, [facility]  •  polyethylene glycol (MIRALAX) powder 17 g, 17 g, Oral, Daily, Morgan Weston DO, 17 g at 03/08/17 0901  •  potassium chloride (MICRO-K) CR capsule 10 mEq, 10 mEq, Oral, Daily, Lucas Nails MD, 10 mEq at 03/08/17 0904  •  predniSONE (DELTASONE) tablet 40 mg, 40 mg, Oral, Daily, Lucas Nails MD, 40 mg at 03/08/17 0903  •  roflumilast (DALIRESP) tablet 500 mcg, 500 mcg, Oral, Daily, Lucas Nails MD, 500 mcg at 03/08/17 0904  •  rOPINIRole (REQUIP) tablet 3 mg, 3 mg, Oral, TID, Lucas Nails MD, 3 mg at 03/08/17 0902  •  sodium chloride 0.9 % flush 10 mL, 10 mL, Intravenous, PRN, Olman Chapman MD  •  sotalol (BETAPACE) tablet 40 mg, 40 mg, Oral, Q12H, Lucas Nails MD, 40 mg at 03/08/17 0902    Assessment/Plan     Problem List  Principal Problem:    Acute on chronic respiratory failure with hypoxia and hypercapnia  Active Problems:    Hypothyroid    COPD exacerbation    Hypertension    Fatigue    Scabies      Assessment/Plan:    Acute on Chronic Respiratory Failure with hypoxia and hypercapnia  -normally on bipap at night and O2 3 liters NC during day with history of COPD  -seems to be mainly due to COPD exacerbation, although a mild component of CHF could also be playing a role.  -CXR with cardiomegaly, stable chronic changes seen within the lung fields with no evidence of acute parenchymal disease.  -Blood Cx NGTD  -continue Brovana nebs, Daliresp, Symbicort.  -IV Solumedrol d/c'd and now started prednisone 40 mg po daily  -IV doxycycline d/c'd, now changed to PO  -DuoNeb q6h  -Off BiPAP, Currently on 6L NC Sats 97%, wean to baseline as tolerated     Diastolic CHF  -troponin negative, , EKG LAD, NSR, no acute ST changes, CXR with cardiomegaly.  -ECHO from July 2016 with:  · Left ventricular function is normal. Estimated EF = 50%.  · Moderate tricuspid valve regurgitation is present.  · Moderate mitral valve  regurgitation is present  · Right ventricular cavity is mild-to-moderately dilated.  · Moderate aortic valve regurgitation is present.  · Mild aortic valve stenosis is present.  -continue Sotalol, lasix, Imdur.     Hypothyroidism   -continue synthroid.     Gen Weakness  -normally able to walk around with a walker  -PT/OT  -CM consulted to help with placement    Scabies  -permethrin     Hypokalemia  -replaced PO as needed     Left leg pain  -Left femur and tib/fib films with NO fx or dislocation.     Palliative care involved.  Code Status: AND/DNR      DVT prophylaxis:  heparin  Discharge Planning: I expect patient to be discharged to rehab vs home with HH/PT in 1-2 days.  CM consulted to help with placement.      Stephania Wallace, APRN   03/08/17   2:59 PM    Please note that portions of this note may have been completed with a voice recognition program. Efforts were made to edit the dictations, but occasionally words are mistranscribed.

## 2017-03-08 NOTE — PLAN OF CARE
Problem: Respiratory Insufficiency (Adult)  Goal: Identify Related Risk Factors and Signs and Symptoms    03/08/17 0359   Respiratory Insufficiency   Related Risk Factors (Respiratory Insufficiency) activity intolerance   Signs and Symptoms (Respiratory Insufficiency) cough (productive/ineffective);decreased oxygen saturation       Goal: Effective Ventilation  Outcome: Ongoing (interventions implemented as appropriate)    Problem: Fall Risk (Adult)  Goal: Identify Related Risk Factors and Signs and Symptoms  Outcome: Ongoing (interventions implemented as appropriate)  Goal: Absence of Falls  Outcome: Ongoing (interventions implemented as appropriate)    Problem: Skin Integrity Impairment, Risk/Actual (Adult)  Goal: Identify Related Risk Factors and Signs and Symptoms  Outcome: Ongoing (interventions implemented as appropriate)  Goal: Skin Integrity/Wound Healing  Outcome: Ongoing (interventions implemented as appropriate)    Problem: Patient Care Overview (Adult)  Goal: Plan of Care Review  Outcome: Ongoing (interventions implemented as appropriate)

## 2017-03-08 NOTE — PLAN OF CARE
"Problem: Respiratory Insufficiency (Adult)  Goal: Identify Related Risk Factors and Signs and Symptoms  Outcome: Ongoing (interventions implemented as appropriate)  Goal: Acid/Base Balance  Outcome: Ongoing (interventions implemented as appropriate)  Goal: Effective Ventilation  Outcome: Ongoing (interventions implemented as appropriate)    Problem: Fall Risk (Adult)  Goal: Identify Related Risk Factors and Signs and Symptoms  Outcome: Ongoing (interventions implemented as appropriate)  Goal: Absence of Falls  Outcome: Ongoing (interventions implemented as appropriate)    Problem: Skin Integrity Impairment, Risk/Actual (Adult)  Goal: Identify Related Risk Factors and Signs and Symptoms  Outcome: Ongoing (interventions implemented as appropriate)  Goal: Skin Integrity/Wound Healing  Outcome: Ongoing (interventions implemented as appropriate)    Problem: Patient Care Overview (Adult)  Goal: Plan of Care Review    03/08/17 1312   Coping/Psychosocial Response Interventions   Plan Of Care Reviewed With patient;daughter;son   Patient Care Overview   Progress progress toward functional goals as expected   Outcome Evaluation   Outcome Summary/Follow up Plan Pt. remains A&O. Atempted several times today to wean 02 however has required going back and forth between 4 and 6 L/NC. 02 sats at times will drop into high 80's with 4l/nc with activities such as eating or talking. Currently on 4L/NC at rest. Pt. done well getting up to bedside commode today with very minimal assist required and pt stated, \" I feel like my strength is coming back after I rested last night.\" Itching r/t scabies has improved significantly. Pt. is still unable to void at this time. Bladder scan performed at 11:30 with only 160ml of urine noted however at 3:30 pm 441ml noted per scanner and obtained 425ml damian colored urine per straight cath after patient voiced c/o of abd. discomfort and distention noted.        Goal: Adult Individualization and " Mutuality  Outcome: Ongoing (interventions implemented as appropriate)  Goal: Discharge Needs Assessment  Outcome: Ongoing (interventions implemented as appropriate)

## 2017-03-09 NOTE — PROGRESS NOTES
Continued Stay Note  [facility]     Patient Name: Pattie Dey  MRN: [id number]  Today's Date: 3/9/2017    Admit Date: 3/6/2017          Discharge Plan       03/09/17 0958    Case Management/Social Work Plan    Plan Rehab    Patient/Family In Agreement With Plan yes    Additional Comments Follow up call to Romana at [facility] to check status of referral, left message for return call. CM following.               Discharge Codes     None        Expected Discharge Date and Time     Expected Discharge Date Expected Discharge Time    Mar 10, 2017             Cleo Rosario RN

## 2017-03-09 NOTE — PROGRESS NOTES
Acute Care - Physical Therapy Treatment Note  [facility]     Patient Name: Pattie Dey  : 1924  MRN: [id number]  Today's Date: 3/9/2017  Onset of Illness/Injury or Date of Surgery Date: 17  Date of Referral to PT: 17  Referring Physician: ISIA Dickerson DO    Admit Date: 3/6/2017    Visit Dx:    ICD-10-CM ICD-9-CM   1. Acute on chronic respiratory failure with hypoxia and hypercapnia J96.21 518.84    J96.22 786.09     799.02   2. COPD exacerbation J44.1 491.21   3. Shortness of breath R06.02 786.05   4. Weakness R53.1 780.79   5. Confusion R41.0 298.9   6. Hypokalemia E87.6 276.8   7. Scabies B86 133.0   8. Impaired functional mobility, balance, gait, and endurance Z74.09 V49.89   9. Impaired mobility and ADLs Z74.09 799.89     Patient Active Problem List   Diagnosis   • Pneumonia   • Coronary artery disease   • Diastolic congestive heart failure   • Hypothyroid   • Dyslipidemia   • Restless leg syndrome   • Atrial fibrillation   • NSTEMI (non-ST elevated myocardial infarction)   • Acute on chronic respiratory failure with hypoxia and hypercapnia   • COPD exacerbation   • Sepsis   • Nodule of right lung   • LUE Cellulitis   • Altered mental state   • Hypertension   • Small bowel obstruction   • Carotid bruit   • Anemia   • Fatigue   • Scabies               Adult Rehabilitation Note       17 1035          Rehab Assessment/Intervention    Discipline physical therapist  -      Document Type therapy note (daily note)  -      Subjective Information agree to therapy;complains of;nausea/vomiting  -SJ      Patient Effort, Rehab Treatment good  -SJ      Symptoms Noted During/After Treatment none  -SJ      Precautions/Limitations fall precautions;oxygen therapy device and L/min  -SJ      Recorded by [SJ] Thuy Berg PT      Vital Signs    Pre Systolic BP Rehab 131  -SJ      Pre Treatment Diastolic BP 63  -SJ      Pretreatment Heart Rate (beats/min) 102  -SJ      Posttreatment Heart Rate  (beats/min) 100  -SJ      Pre SpO2 (%) 93  -SJ      O2 Delivery Pre Treatment supplemental O2  -SJ      Pre Patient Position Supine  -SJ      Intra Patient Position Standing  -SJ      Post Patient Position Sitting  -SJ      Recorded by [SJ] Thuy Berg PT      Pain Assessment    Pain Assessment Atkinson-Dempsey FACES  -SJ      Atkinson-Dempsey FACES Pain Rating 0  -SJ      Pain Score 0  -SJ      Recorded by [SJ] Thuy Berg PT      Cognitive Assessment/Intervention    Current Cognitive/Communication Assessment functional  -SJ      Orientation Status oriented x 4  -SJ      Follows Commands/Answers Questions 100% of the time;able to follow single-step instructions;needs cueing  -SJ      Personal Safety mild impairment;decreased awareness, need for safety  -      Personal Safety Interventions fall prevention program maintained;gait belt;muscle strengthening facilitated;nonskid shoes/slippers when out of bed  -SJ      Recorded by [SJ] Thuy Berg PT      Bed Mobility, Assessment/Treatment    Bed Mobility, Assistive Device head of bed elevated  -SJ      Bed Mob, Supine to Sit, [place] minimum assist (75% patient effort);verbal cues required  -SJ      Bed Mobility, Safety Issues decreased use of arms for pushing/pulling;decreased use of legs for bridging/pushing  -SJ      Bed Mobility, Impairments strength decreased;coordination impaired;impaired balance   difficulty using RUE due to RTC dysfunction  -SJ      Recorded by [SJ] Thuy Berg PT      Transfer Assessment/Treatment    Transfers, Sit-Stand [place] minimum assist (75% patient effort);2 person assist required;verbal cues required  -SJ      Transfers, Stand-Sit [place] minimum assist (75% patient effort);verbal cues required  -SJ      Transfers, Sit-Stand-Sit, Assist Device rolling walker   rollator  -      Transfer, Safety Issues weight-shifting ability decreased;loses balance backward  -      Transfer, Impairments impaired  balance;coordination impaired;strength decreased  -SJ      Transfer, Comment elevated surface  -SJ      Recorded by [SJ] Thuy Berg PT      Gait Assessment/Treatment    Gait, [place] Level contact guard assist;2 person assist required;verbal cues required  -SJ      Gait, Assistive Device rolling walker   rollator  -SJ      Gait, Distance (Feet) 50  -SJ      Gait, Gait Pattern Analysis swing-to gait  -SJ      Gait, Gait Deviations keira decreased;decreased heel strike;double stance time increased;forward flexed posture  -SJ      Gait, Safety Issues step length decreased;weight-shifting ability decreased;loses balance backward;supplemental O2  -SJ      Gait, Impairments strength decreased;impaired balance;coordination impaired  -SJ      Gait, Comment Pt req encouragement to participate, agreed to walking in-room  -SJ      Recorded by [DANIEL] Thuy Berg PT      Therapy Exercises    Bilateral Lower Extremities AROM:;10 reps;sitting;heel raises;hip flexion;LAQ  -SJ      Recorded by [DANIEL] Thuy Berg PT      Positioning and Restraints    Pre-Treatment Position in bed  -SJ      Post Treatment Position chair  -SJ      In Chair reclined;call light within reach;encouraged to call for assist;with family/caregiver;heels elevated  -SJ      Recorded by [DANIEL] Thuy Berg PT        User Key  (r) = Recorded By, (t) = Taken By, (c) = Cosigned By    Initials Name Effective Dates    DANIEL Berg PT 06/19/15 -                 IP PT Goals       03/09/17 1108 03/07/17 1010       Bed Mobility PT LTG    Bed Mobility PT LTG, Date Established  03/07/17  -DM     Bed Mobility PT LTG, Time to Achieve  5 days  -DM     Bed Mobility PT LTG, Activity Type  all bed mobility  -DM     Bed Mobility PT LTG, [place] Level supervision required  -SJ moderate assist (50% patient effort)  -DM     Bed Mobility PT LTG, Outcome goal revised  -      Transfer Training PT LTG    Transfer Training PT LTG, Date Established   03/07/17  -DM     Transfer Training PT LTG, Time to Achieve  5 days  -DM     Transfer Training PT LTG, Activity Type  bed to chair /chair to bed;sit to stand/stand to sit  -DM     Transfer Training PT LTG, [place] Level contact guard assist  -SJ minimum assist (75% patient effort);2 person assist required  -DM     Transfer Training PT LTG, Assist Device  walker, rolling  -DM     Transfer Training PT LTG, Outcome goal revised  -SJ      Gait Training PT LTG    Gait Training Goal PT LTG, Date Established  03/07/17  -DM     Gait Training Goal PT LTG, Time to Achieve  5 days  -DM     Gait Training Goal PT LTG, [place] Level contact guard assist  -SJ moderate assist (50% patient effort)  -DM     Gait Training Goal PT LTG, Assist Device  walker, rolling  -DM     Gait Training Goal PT LTG, Distance to Achieve 200  -SJ 25  -DM     Gait Training Goal PT LTG, Outcome goal revised  -SJ      Stair Training PT LTG    Stair Training Goal PT LTG, Date Established  03/07/17  -DM     Stair Training Goal PT LTG, Time to Achieve  5 days  -DM     Stair Training Goal PT LTG, Number of Steps  2  -DM     Stair Training Goal PT LTG, [place] Level  moderate assist (50% patient effort);2 person assist required  -DM     Stair Training Goal PT LTG, Assist Device  1 handrail  -DM     Stair Training Goal PT LTG, Outcome goal ongoing  -SJ        User Key  (r) = Recorded By, (t) = Taken By, (c) = Cosigned By    Initials Name Provider Type    DANIEL Berg, PT Physical Therapist    YONG Ron, PT Physical Therapist          Physical Therapy Education     Title: PT OT SLP Therapies (Active)     Topic: Physical Therapy (Active)     Point: Mobility training (Active)    Learning Progress Summary    Learner Readiness Method Response Comment Documented by Status   Patient Acceptance E,D NR  SJ 03/09/17 1111 Active    Eager E,D NR  DM 03/07/17 1010 Active   Family Acceptance E,D NR  SJ 03/09/17 1111 Active    Eager E,D NR  DM  03/07/17 1010 Active               Point: Home exercise program (Active)    Learning Progress Summary    Learner Readiness Method Response Comment Documented by Status   Patient Acceptance E,D NR   03/09/17 1111 Active    Eager E,D NR  DM 03/07/17 1010 Active   Family Acceptance E,D NR   03/09/17 1111 Active    Eager E,D NR  DM 03/07/17 1010 Active               Point: Body mechanics (Active)    Learning Progress Summary    Learner Readiness Method Response Comment Documented by Status   Patient Acceptance E,D NR   03/09/17 1111 Active    Eager E,D NR  DM 03/07/17 1010 Active   Family Acceptance E,D NR   03/09/17 1111 Active    Eager E,D NR  DM 03/07/17 1010 Active               Point: Precautions (Active)    Learning Progress Summary    Learner Readiness Method Response Comment Documented by Status   Patient Acceptance E,D NR   03/09/17 1111 Active    Eager E,D NR  DM 03/07/17 1010 Active   Family Acceptance E,D NR   03/09/17 1111 Active    Eager E,D NR  DM 03/07/17 1010 Active                      User Key     Initials Effective Dates Name Provider Type Discipline     06/19/15 -  Thuy Berg, PT Physical Therapist PT     06/19/15 -  Sofiya Ron, PT Physical Therapist PT                    PT Recommendation and Plan  Anticipated Discharge Disposition: home with home health  PT Frequency: daily  Plan of Care Review  Plan Of Care Reviewed With: patient  Progress: improving  Outcome Summary/Follow up Plan: Pt with improved participation and increased independence with mobility today. Pt's goals upgraded where appropriate to reflect progress. Pt amb 50ft with rollator walker with CGA x2. Continue POC.          Outcome Measures       03/09/17 1035 03/07/17 0825       How much help from another person do you currently need...    Turning from your back to your side while in flat bed without using bedrails? 3  -SJ 2  -DM     Moving from lying on back to sitting on the side of a flat bed without  bedrails? 3  -SJ 2  -DM     Moving to and from a bed to a chair (including a wheelchair)? 3  -SJ 2  -DM     Standing up from a chair using your arms (e.g., wheelchair, bedside chair)? 3  -SJ 2  -DM     Climbing 3-5 steps with a railing? 2  -SJ 1  -DM     To walk in hospital room? 3  -SJ 2  -DM     AM-PAC 6 Clicks Score 17  -SJ 11  -DM     Functional Assessment    Outcome Measure Options AM-PAC 6 Clicks Basic Mobility (PT)  -SJ AM-PAC 6 Clicks Basic Mobility (PT)  -DM       User Key  (r) = Recorded By, (t) = Taken By, (c) = Cosigned By    Initials Name Provider Type    DANIEL Berg PT Physical Therapist    YONG Ron, PT Physical Therapist           Time Calculation:         PT Charges       03/09/17 1111          Time Calculation    Start Time 1035  -SJ      PT Received On 03/09/17  -      PT Goal Re-Cert Due Date 03/17/17  -      Time Calculation- PT    Total Timed Code Minutes- PT 23 minute(s)  -        User Key  (r) = Recorded By, (t) = Taken By, (c) = Cosigned By    Initials Name Provider Type    DANIEL Berg PT Physical Therapist          Therapy Charges for Today     Code Description Service Date Service Provider Modifiers Qty    46463233051 HC GAIT TRAINING EA 15 MIN 3/9/2017 Thuy Berg PT GP 1    92515277064 HC PT THER PROC EA 15 MIN 3/9/2017 Thuy Berg PT GP 1          PT G-Codes  PT Professional Judgement Used?: Yes  Outcome Measure Options: AM-PAC 6 Clicks Basic Mobility (PT)  Score: 11  Functional Limitation: Mobility: Walking and moving around  Mobility: Walking and Moving Around Current Status (): At least 60 percent but less than 80 percent impaired, limited or restricted  Mobility: Walking and Moving Around Goal Status (): At least 60 percent but less than 80 percent impaired, limited or restricted    Thuy Berg PT  3/9/2017

## 2017-03-09 NOTE — SIGNIFICANT NOTE
Palliative Team Meeting Attendance  13:00  NAHUN Stover RN, CHDO KATHRYN Urban M.Div., [facility], [facility]              KATHRYN Mccarthy M.Div., [facility], [facility]              KATHRYN Rodriguez RN, MERCY Gomez, APRN

## 2017-03-09 NOTE — PLAN OF CARE
Problem: Patient Care Overview (Adult)  Goal: Adult Individualization and Mutuality  Outcome: Ongoing (interventions implemented as appropriate)  Goal: Discharge Needs Assessment  Outcome: Ongoing (interventions implemented as appropriate)    03/09/17 0513   Discharge Needs Assessment   Concerns To Be Addressed denies needs/concerns at this time   Readmission Within The Last 30 Days no previous admission in last 30 days   Equipment Needed After Discharge none   Current Health   Anticipated Changes Related to Illness inability to care for self   Self-Care   Equipment Currently Used at Home commode;lift device;power chair, (recliner lift);raised toilet;respiratory supplies;shower chair;walker, rolling;wheelchair   Living Environment   Transportation Available car;family or friend will provide

## 2017-03-09 NOTE — PROGRESS NOTES
Continued Stay Note  [facility]     Patient Name: Pattie Dey  MRN: [id number]  Today's Date: 3/9/2017    Admit Date: 3/6/2017          Discharge Plan     Consent obtained for participation in the [facility]. Mami Gupta RN                Discharge Codes     None        Expected Discharge Date and Time     Expected Discharge Date Expected Discharge Time    Mar 10, 2017             Mami Gupta RN

## 2017-03-09 NOTE — PLAN OF CARE
Problem: Skin Integrity Impairment, Risk/Actual (Adult)  Goal: Identify Related Risk Factors and Signs and Symptoms  Outcome: Ongoing (interventions implemented as appropriate)    03/09/17 0517   Skin Integrity Impairment, Risk/Actual   Skin Integrity Impairment, Risk/Actual: Related Risk Factors age extremes;environmental exposure;infection/disease process;tissue perfusion impaired   Signs and Symptoms (Skin Integrity Impairment) rash       Goal: Skin Integrity/Wound Healing  Outcome: Ongoing (interventions implemented as appropriate)    03/09/17 0517   Skin Integrity Impairment, Risk/Actual (Adult)   Skin Integrity/Wound Healing making progress toward outcome

## 2017-03-09 NOTE — PLAN OF CARE
Problem: Patient Care Overview (Adult)  Goal: Plan of Care Review  Outcome: Ongoing (interventions implemented as appropriate)    03/09/17 1122   Coping/Psychosocial Response Interventions   Plan Of Care Reviewed With patient   Patient Care Overview   Progress progress toward functional goals as expected   Outcome Evaluation   Outcome Summary/Follow up Plan OT eval complete. Pt presents w/ decreased functional independence from baseline. Recommend pt DC to SNF for rehab placement. Recommend cont skilled IPOT intervention for ADL retraining, t/f training, and strengthening.          Problem: Inpatient Occupational Therapy  Goal: Transfer Training Goal 1 LTG- OT  Outcome: Ongoing (interventions implemented as appropriate)    03/09/17 1122   Transfer Training OT LTG   Transfer Training OT LTG, Date Established 03/09/17   Transfer Training OT LTG, Time to Achieve by discharge   Transfer Training OT LTG, Activity Type bed to chair /chair to bed;sit to stand/stand to sit;toilet   Transfer Training OT LTG, [place] Level contact guard assist   Transfer Training OT LTG, Additional Goal AAD       Goal: Strength Goal LTG- OT  Outcome: Ongoing (interventions implemented as appropriate)    03/09/17 1122   Strength OT LTG   Strength Goal OT LTG, Date Established 03/09/17   Strength Goal OT LTG, Time to Achieve by discharge   Strength Goal OT LTG, Measure to Achieve Pt will tolerated BUE AROM/AAROM HEP w/ PLB x15 reps to increase strength/endurance to promote ADL performance.        Goal: UB Dressing Goal LTG- OT  Outcome: Ongoing (interventions implemented as appropriate)    03/09/17 1122   UB Dressing OT LTG   UB Dressing Goal OT LTG, Date Established 03/09/17   UB Dressing Goal OT LTG, Time to Achieve by discharge   UB Dressing Goal OT LTG, Activity Type Utilizing natalia-dressing technique.    UB Dressing Goal OT LTG, [place] Level minimum assist (75% patient effort)   UB Dressing Goal OT LTG, Additional Goal AAD        Goal: Activity Tolerance Goal LTG- OT  Outcome: Ongoing (interventions implemented as appropriate)    03/09/17 1122   Activity Tolerance OT LTG   Activity Tolerance Goal OT LTG, Date Established 03/09/17   Activity Tolerance Goal OT LTG, Time to Achieve by discharge   Activity Tolerance Goal OT LTG, Activity Level 15 min activity   Activity Tolerance Goal OT LTG, Additional Goal x1 seated rest break, O2 sats >89%

## 2017-03-09 NOTE — PROGRESS NOTES
Continued Stay Note  [facility]     Patient Name: Pattie Dey  MRN: [id number]  Today's Date: 3/9/2017    Admit Date: 3/6/2017          Discharge Plan       03/09/17 1352    Case Management/Social Work Plan    Plan [facility]    Patient/Family In Agreement With Plan yes    Additional Comments Per Romana at [facility], they are able to accept patient to a skilled bed tomorrow if medically ready. Updated patient and daughter at bedside, they are in agreement with plan. Daughter states she will speak with the rest of the family to determine if they are able to transport patient via car, as she does not qualify for an ambulance. CM continues to follow.               Discharge Codes     None        Expected Discharge Date and Time     Expected Discharge Date Expected Discharge Time    Mar 10, 2017             Cleo Rosario RN

## 2017-03-09 NOTE — PROGRESS NOTES
Hospitalist Daily Progress Note    Date of Admission: 3/6/2017   LOS: 2 days   PCP: No Known Provider    Chief Complaint:  F/u dyspnea    Subjective   History of Present Illness  Sitting up in bed, eating breakfast, family by bedside  Breathing has improved.  Yesterday and today continues to retain urine per bladder scans done by RN.  Denies any abd pain, no N/V, no dysuria, no frequency.    Review of Systems  General: no fevers, chills  Cardiovascular: no chest pain, palpitations  Abdomen: No abdominal pain, nausea, vomiting, or diarrhea  Neurologic: No focal weakness  All other systems reviewed and negative.    Objective   Physical Exam:  I have reviewed the vital signs.  Temp:  [98.2 °F (36.8 °C)-98.4 °F (36.9 °C)] 98.3 °F (36.8 °C)  Heart Rate:  [] 69  Resp:  [8-16] 13  BP: (101-132)/(61-78) 131/63    Objective  General Appearance:   Alert, cooperative, no distress  Head:   Normocephalic, atraumatic  Eyes:   PERRL EOMI, Sclerae anicteric  Neck:  Supple, no mass  Lungs: distant BS o/w CTAB, respirations unlabored  Heart:  RRR, S1 and S2 normal, no murmur, rub or gallop  Abdomen: Soft, non-tender, bowel sounds active, nondistended  Extremities: No clubbing, cyanosis, or trace edema to lower extremities  Pulses: 2+ and symmetric in distal lower extremities  Skin: No rashes no jaundice  Neurologic: Oriented x3, CN 2-12 intact, gen. Weakness.    Results Review:    I have reviewed the labs, radiology results and diagnostic studies.      Results from last 7 days  Lab Units 03/07/17  0559   WBC 10*3/mm3 8.37   HEMOGLOBIN g/dL 14.4   PLATELETS 10*3/mm3 248       Results from last 7 days  Lab Units 03/08/17  0828   SODIUM mmol/L 142   POTASSIUM mmol/L 3.7   TOTAL CO2 mmol/L 36.0*   CREATININE mg/dL 0.70   GLUCOSE mg/dL 113*       I have reviewed the medications.    ---------------------------------------------------------------------------------------------  Assessment/Plan   Assessment & Plan  Assessment/Problem  List    Principal Problem:    Acute on chronic respiratory failure with hypoxia and hypercapnia  Active Problems:    Hypothyroid    COPD exacerbation    Hypertension    Fatigue    Scabies       Plan  Urine Retention  -UA with no UTI, no protein, no blood.  Urine Cx NGTD.  -will get Renal US and Urology consult.    Acute on Chronic Respiratory Failure with hypoxia and hypercapnia  -normally on bipap at night and O2 3 liters NC during day with history of COPD  -seems to be mainly due to COPD exacerbation, although a mild component of CHF could also be playing a role.  -CXR with cardiomegaly, stable chronic changes seen within the lung fields with no evidence of acute parenchymal disease.  -Blood Cx NGTD  -continue Brovana nebs, Daliresp, Symbicort.  -continue prednisone 40 mg po daily and doxycycline PO  -DuoNeb q6h  -Currently on 5L NC Sats 90%, wean to baseline as tolerated      Diastolic CHF  -troponin negative, , EKG LAD, NSR, no acute ST changes, CXR with cardiomegaly.  -ECHO from July 2016 with:  · Left ventricular function is normal. Estimated EF = 50%.  · Moderate tricuspid valve regurgitation is present.  · Moderate mitral valve regurgitation is present  · Right ventricular cavity is mild-to-moderately dilated.  · Moderate aortic valve regurgitation is present.  · Mild aortic valve stenosis is present.  -continue Sotalol, lasix, Imdur.      Hypothyroidism   -continue synthroid.      Gen Weakness  -normally able to walk around with a walker  -PT/OT  -CM consulted to help with placement     Scabies  -permethrin completed      Hypokalemia  -replaced PO as needed      Left leg pain  -Left femur and tib/fib films with NO fx or dislocation.      Palliative care involved.  Code Status: AND/DNR        DVT prophylaxis: heparin  Discharge Planning: I expect patient to be discharged to rehab in 1-2 days. CM consulted to help with placement.        Lucas Nails MD 03/09/17 9:02 AM

## 2017-03-09 NOTE — PLAN OF CARE
Problem: Patient Care Overview (Adult)  Goal: Plan of Care Review  Outcome: Ongoing (interventions implemented as appropriate)    03/09/17 1108   Coping/Psychosocial Response Interventions   Plan Of Care Reviewed With patient   Patient Care Overview   Progress improving   Outcome Evaluation   Outcome Summary/Follow up Plan Pt with improved participation and increased independence with mobility today. Pt's goals upgraded where appropriate to reflect progress. Pt amb 50ft with rollator walker with CGA x2. Continue POC.         Problem: Inpatient Physical Therapy  Goal: Bed Mobility Goal LTG- PT  Outcome: Ongoing (interventions implemented as appropriate)    03/07/17 1010 03/09/17 1108   Bed Mobility PT LTG   Bed Mobility PT LTG, Date Established 03/07/17 --    Bed Mobility PT LTG, Time to Achieve 5 days --    Bed Mobility PT LTG, Activity Type all bed mobility --    Bed Mobility PT LTG, [place] Level --  supervision required   Bed Mobility PT LTG, Outcome --  goal revised       Goal: Transfer Training Goal 1 LTG- PT  Outcome: Ongoing (interventions implemented as appropriate)    03/07/17 1010 03/09/17 1108   Transfer Training PT LTG   Transfer Training PT LTG, Date Established 03/07/17 --    Transfer Training PT LTG, Time to Achieve 5 days --    Transfer Training PT LTG, Activity Type bed to chair /chair to bed;sit to stand/stand to sit --    Transfer Training PT LTG, [place] Level --  contact guard assist   Transfer Training PT LTG, Assist Device walker, rolling --    Transfer Training PT LTG, Outcome --  goal revised       Goal: Gait Training Goal LTG- PT  Outcome: Ongoing (interventions implemented as appropriate)    03/07/17 1010 03/09/17 1108   Gait Training PT LTG   Gait Training Goal PT LTG, Date Established 03/07/17 --    Gait Training Goal PT LTG, Time to Achieve 5 days --    Gait Training Goal PT LTG, [place] Level --  contact guard assist   Gait Training Goal PT LTG, Assist Device walker,  rolling --    Gait Training Goal PT LTG, Distance to Achieve --  200   Gait Training Goal PT LTG, Outcome --  goal revised       Goal: Stair Training Goal LTG- PT  Outcome: Ongoing (interventions implemented as appropriate)    03/07/17 1010 03/09/17 1108   Stair Training PT LTG   Stair Training Goal PT LTG, Date Established 03/07/17 --    Stair Training Goal PT LTG, Time to Achieve 5 days --    Stair Training Goal PT LTG, Number of Steps 2 --    Stair Training Goal PT LTG, [place] Level moderate assist (50% patient effort);2 person assist required --    Stair Training Goal PT LTG, Assist Device 1 handrail --    Stair Training Goal PT LTG, Outcome --  goal ongoing

## 2017-03-09 NOTE — PROGRESS NOTES
Acute Care - Occupational Therapy Initial Evaluation  [facility]     Patient Name: Pattie Dey  : 1924  MRN: [id number]  Today's Date: 3/9/2017  Onset of Illness/Injury or Date of Surgery Date: 17  Date of Referral to OT: 17  Referring Physician: BONITA Wallace    Admit Date: 3/6/2017       ICD-10-CM ICD-9-CM   1. Acute on chronic respiratory failure with hypoxia and hypercapnia J96.21 518.84    J96.22 786.09     799.02   2. COPD exacerbation J44.1 491.21   3. Shortness of breath R06.02 786.05   4. Weakness R53.1 780.79   5. Confusion R41.0 298.9   6. Hypokalemia E87.6 276.8   7. Scabies B86 133.0   8. Impaired functional mobility, balance, gait, and endurance Z74.09 V49.89   9. Impaired mobility and ADLs Z74.09 799.89     Patient Active Problem List   Diagnosis   • Pneumonia   • Coronary artery disease   • Diastolic congestive heart failure   • Hypothyroid   • Dyslipidemia   • Restless leg syndrome   • Atrial fibrillation   • NSTEMI (non-ST elevated myocardial infarction)   • Acute on chronic respiratory failure with hypoxia and hypercapnia   • COPD exacerbation   • Sepsis   • Nodule of right lung   • LUE Cellulitis   • Altered mental state   • Hypertension   • Small bowel obstruction   • Carotid bruit   • Anemia   • Fatigue   • Scabies     Past Medical History   Diagnosis Date   • Anemia    • Anxiety    • Arthritis    • Asthma    • Atrial fibrillation    • Cellulitis    • Chronic pain    • Chronic respiratory failure    • COPD (chronic obstructive pulmonary disease)    • Coronary artery disease    • Depression    • Diastolic congestive heart failure    • Dyslipidemia    • GERD (gastroesophageal reflux disease)    • H/O fracture of rib    • History of MRSA infection of lungs      pneumonia   • History of small bowel obstruction    • Hyperlipidemia    • Hypertension    • Hypothyroid    • Pneumonia    • Restless leg syndrome    • S/P coronary artery stent placement    • Scabies      Past Surgical  History   Procedure Laterality Date   • Hand surgery     • Tonsillectomy     • Coronary stent placement       5   • Cardiac catheterization     • Chest tube insertion            OT ASSESSMENT FLOWSHEET (last 72 hours)      OT Evaluation       03/09/17 1048 03/09/17 1035 03/09/17 0922 03/09/17 0600 03/09/17 0513    Rehab Evaluation    Document Type evaluation  -CL therapy note (daily note)  -SJ       Subjective Information agree to therapy;complains of;nausea/vomiting  -CL agree to therapy;complains of;nausea/vomiting  -SJ       Evaluation Not Performed   patient/family declined evaluation   Pt finishing breakfast, refused therapy until finished. Will check back as time allows.   -CL      Patient Effort, Rehab Treatment good  -CL good  -SJ       Symptoms Noted During/After Treatment none  -CL none  -SJ       General Information    Patient Profile Review yes  -CL        Onset of Illness/Injury or Date of Surgery Date 03/06/17  -CL        Referring Physician BONITA Wallace  -CL        Pertinent History Of Current Problem Pt presented to ED w/ c/o fatigue and increased confusion. Pt found to have SOA and moderate respiratory distress. Pt dx w/ acute on chronic respiratory failure w/ hypoxia and hypercapnia and COPD exacerbation. PMH: COPD, CHF, A-fib, frequent falls.   -CL        Precautions/Limitations fall precautions;oxygen therapy device and L/min   R RTC tear  -CL fall precautions;oxygen therapy device and L/min  -SJ       Prior Level of Function independent:;all household mobility;transfer;dressing;mod assist:;bathing;dependent:;home management;cooking;cleaning;driving  -CL        Equipment Currently Used at Home commode;power chair, (recliner lift);raised toilet;walker, rolling;wheelchair;bath bench  -CL    commode;lift device;power chair, (recliner lift);raised toilet;respiratory supplies;shower chair;walker, rolling;wheelchair  -LD    Plans/Goals Discussed With patient and family;agreed upon  -CL        Risks  Reviewed patient and family:;LOB;nausea/vomiting;dizziness;increased discomfort;change in vital signs;lines disloged  -CL        Benefits Reviewed patient and family:;improve function;increase independence;increase strength;increase balance;decrease pain;increase knowledge;improve skin integrity  -CL        Barriers to Rehab medically complex;previous functional deficit  -CL        Living Environment    Lives With --   Refer to PT eval  -CL        Transportation Available     car;family or friend will provide  -LD    Clinical Impression    Date of Referral to OT 03/08/17  -CL        OT Diagnosis Decreased independence w/ ADLs.   -CL        Patient/Family Goals Statement Return to PLOF.   -CL        Criteria for Skilled Therapeutic Interventions Met yes;treatment indicated  -CL        Rehab Potential good, to achieve stated therapy goals  -CL        Therapy Frequency daily  -CL        Anticipated Equipment Needs At Discharge --   TBD  -CL        Anticipated Discharge Disposition skilled nursing facility  -CL        Vital Signs    Pre Systolic BP Rehab 131  -  -SJ       Pre Treatment Diastolic BP 63  -CL 63  -SJ       Pretreatment Heart Rate (beats/min) 102  -  -SJ       Posttreatment Heart Rate (beats/min) 100  -  -SJ       Pre SpO2 (%) 93  -CL 93  -SJ       O2 Delivery Pre Treatment supplemental O2  -CL supplemental O2  -SJ       Pre Patient Position Supine  -CL Supine  -SJ       Intra Patient Position Standing  -CL Standing  -SJ       Post Patient Position Sitting  -CL Sitting  -SJ       Pain Assessment    Pain Assessment Atkinson-Dempsey FACES  -CL Atkinson-Dempsey FACES  -SJ       Atkinson-Dempsey FACES Pain Rating 0  -CL 0  -SJ       Pain Score 0  -CL 0  -SJ       Cognitive Assessment/Intervention    Current Cognitive/Communication Assessment functional  -CL functional  -SJ       Orientation Status oriented x 4  -CL oriented x 4  -SJ       Follows Commands/Answers Questions 100% of the time;needs cueing;needs  repetition  -% of the time;able to follow single-step instructions;needs cueing  -SJ       Personal Safety mild impairment;decreased awareness, need for assist;decreased awareness, need for safety  -CL mild impairment;decreased awareness, need for safety  -SJ       Personal Safety Interventions fall prevention program maintained;gait belt;nonskid shoes/slippers when out of bed  -CL fall prevention program maintained;gait belt;muscle strengthening facilitated;nonskid shoes/slippers when out of bed  -SJ       ROM (Range of Motion)    General ROM Detail PARDEEP FARIAS AAROM ~120 2/2 to RTC tear, elbow/ WFL. LUE grossly WFL.   -CL        MMT (Manual Muscle Testing)    General MMT Assessment Detail PARDEEP FARIAS deferred d/t RTC tear, bicep/tricep 4-/5,  WFL. LUE grossly 4-/5.   -CL        Muscle Tone Assessment    Muscle Tone Assessment    Bilateral Lower Extremities  -LD     Bilateral Lower Extremities Muscle Tone Assessment    mildly decreased tone  -LD     Bed Mobility, Assessment/Treatment    Bed Mobility, Assistive Device  head of bed elevated  -SJ       Bed Mob, Supine to Sit, [place]  minimum assist (75% patient effort);verbal cues required  -SJ       Bed Mobility, Safety Issues  decreased use of arms for pushing/pulling;decreased use of legs for bridging/pushing  -SJ       Bed Mobility, Impairments  strength decreased;coordination impaired;impaired balance   difficulty using RUE due to RTC dysfunction  -SJ       Bed Mobility, Comment Sitting EOB w/ PT upon arrival.   -CL        Transfer Assessment/Treatment    Transfers, Sit-Stand [place] minimum assist (75% patient effort);2 person assist required;verbal cues required  -CL minimum assist (75% patient effort);2 person assist required;verbal cues required  -SJ       Transfers, Stand-Sit [place] minimum assist (75% patient effort);2 person assist required;verbal cues required  -CL minimum assist (75% patient effort);verbal cues required   -SJ       Transfers, Sit-Stand-Sit, Assist Device --   Rollator  -CL rolling walker   rollator  -SJ       Transfer, Safety Issues  weight-shifting ability decreased;loses balance backward  -SJ       Transfer, Impairments  impaired balance;coordination impaired;strength decreased  -SJ       Transfer, Comment VCs and HP/sequencing of steps. EOB elevated to assist in t/f.   -CL elevated surface  -SJ       Functional Mobility    Functional Mobility- Ind. Level contact guard assist;2 person assist required;verbal cues required  -CL        Functional Mobility- Device rollator  -CL        Functional Mobility-Distance (Feet) --   inside room  -CL        Functional Mobility- Comment VCs for safety.   -CL        Balance Skills Training    Sitting-Level of Assistance Close supervision   static at EOB  -CL        Sitting-Balance Support Right upper extremity supported;Left upper extremity supported;Feet supported  -CL        Standing-Level of Assistance Minimum assistance;x2  -CL        Static Standing Balance Support assistive device  -CL        Gait Balance-Level of Assistance Contact guard;x2  -CL        Gait Balance Support assistive device  -CL        Therapy Exercises    Bilateral Lower Extremities  AROM:;10 reps;sitting;heel raises;hip flexion;LAQ  -SJ       Bilateral Upper Extremity 5 reps;AAROM:;shoulder extension/flexion   Pt educated on clasp hand shldr FE   -CL        Sensory Assessment/Intervention    Light Touch LUE;RUE  -CL        LUE Light Touch WNL  -CL        RUE Light Touch WNL  -CL        General Therapy Interventions    Planned Therapy Interventions adaptive equipment training;ADL retraining;balance training;energy conservation;home exercise program;transfer training  -CL        Positioning and Restraints    Pre-Treatment Position in bed  -CL in bed  -SJ       Post Treatment Position chair  -CL chair  -SJ       In Chair reclined;call light within reach;encouraged to call for assist;with  family/caregiver;notified nsg;legs elevated;RUE elevated  -CL reclined;call light within reach;encouraged to call for assist;with family/caregiver;heels elevated  -SJ         03/09/17 0400 03/09/17 0200 03/09/17 0000 03/08/17 2200 03/08/17 2000    Muscle Tone Assessment    Muscle Tone Assessment Bilateral Lower Extremities  -LD Bilateral Lower Extremities  -LD Bilateral Lower Extremities  -LD Bilateral Lower Extremities  -LD Bilateral Lower Extremities  -LD    Bilateral Lower Extremities Muscle Tone Assessment mildly decreased tone  -LD mildly decreased tone  -LD mildly decreased tone  -LD mildly decreased tone  -LD mildly decreased tone  -LD      03/08/17 1220 03/07/17 1130 03/07/17 1118 03/07/17 1117 03/07/17 0825    Rehab Evaluation    Document Type     evaluation  -DM    Subjective Information     agree to therapy  -DM    Patient Effort, Rehab Treatment     good  -DM    Symptoms Noted During/After Treatment     fatigue;shortness of breath;significant change in vital signs  -DM    Symptoms Noted Comment     drowsy;dozed off during rx;son gave home info;PA assessed; iv leaking;awaited nsg to fix;hep locked;nsg will start new IV;p.t. had nsg order soft diet after pt attempted breakfast tray/unable(teeth missing)  -DM    General Information    Patient Profile Review     yes  -DM    Onset of Illness/Injury or Date of Surgery Date     03/06/17  -DM    Referring Physician     ISAI Dickerson DO  -DM    General Observations     power's in bed;tele;02;iv leaking;mult bruises/echymotic areas over all 4 extrem;   -DM    Pertinent History Of Current Problem     onset SOB, LUE cellulitis; funct decline x 1 wk(marked decline past day); ref ABG; To ER w/ slurred speech, decr.o2 sats;Neb rx x 2; BIPAP d/t elev CO2;Sat. incr to 97%; adm. for observation  -DM    Precautions/Limitations     fall precautions;other (see comments);oxygen therapy device and L/min   per pt,has fallen approx 5 times(prev fx ribs,punct lung)  -DM    Prior  Level of Function     min assist:;all household mobility;ADL's;dependent:;home management;cooking;driving;cleaning;shopping   past week,min asst for mobil & ADL'S (Pvt duty aide)  -DM    Equipment Currently Used at Home   commode;lift device;oxygen;power chair, (recliner lift);raised toilet;respiratory supplies;shower chair;walker, rolling;wheelchair;other (see comments)   Pt has Oxygen and a Bipap provided by [facility]. Pt also has a rollator  -DALE  commode;shower chair;raised toilet;oxygen;lift device;walker, rolling;wheelchair   has both rollator (dislikes)&Rwx,lift chair,2 commodes  -DM    Plans/Goals Discussed With     patient and family;agreed upon  -DM    Risks Reviewed     patient and family:;LOB;dizziness;increased discomfort;change in vital signs;lines disloged  -DM    Benefits Reviewed     patient and family:;improve function;increase independence;increase strength;increase balance;decrease pain;increase knowledge  -DM    Barriers to Rehab     medically complex;previous functional deficit  -DM    Living Environment    Lives With   child(anitra), adult   Pt resides in [facility] with daughters Arianna and Rea  -DALE  child(anitra), adult   2 dtrs  -DM    Living Arrangements   house  -DALE  house  -DM    Home Accessibility     stairs to enter home;bed and bath on same level  -DM    Number of Stairs to Enter Home     2  -DM    Stair Railings at Home     outside, present on right side  -DM    Type of Financial/Environmental Concern     none  -DM    Transportation Available   car;family or friend will provide  -DALE  car;family or friend will provide  -DM    Living Environment Comment     pvt. aide hired for ADL'S 2 X/WK (HHPT/OT d/c'd)  -DM    Functional Level Prior    Ambulation    1-->assistive equipment  -DALE     Transferring    1-->assistive equipment  -DALE     Toileting    1-->assistive equipment  -DALE     Bathing    3-->assistive equipment and person  -DALE     Dressing    2-->assistive person  -DALE     Eating     "0-->independent  -DALE     Communication    0-->understands/communicates without difficulty  -DALE     Swallowing    0-->swallows foods/liquids without difficulty  -DALE     Vital Signs    Pre Systolic BP Rehab     132  -DM    Pre Treatment Diastolic BP     88  -DM    Post Systolic BP Rehab     121  -DM    Post Treatment Diastolic BP     80  -DM    Pretreatment Heart Rate (beats/min)     103  -DM    Intratreatment Heart Rate (beats/min)     109  -DM    Posttreatment Heart Rate (beats/min)     102  -DM    Pre SpO2 (%)     94  -DM    O2 Delivery Pre Treatment     supplemental O2   6 L  -DM    Intra SpO2 (%)     80  -DM    O2 Delivery Intra Treatment     supplemental O2  -DM    Post SpO2 (%)     93  -DM    O2 Delivery Post Treatment     supplemental O2  -DM    Pre Patient Position     Supine  -DM    Intra Patient Position     Standing  -DM    Post Patient Position     Sitting  -DM    Pain Assessment    Pain Assessment     Atkinson-Dempsey FACES  -DM    Atkinson-Dempsey FACES Pain Rating     4  -DM    Pain Score 0   pain with standing up that radiates down legs.   -ST 2   left arm r/t iv. left LE sciatic nerve pain with standing.   -ST       Pain Type     Acute pain  -DM    Pain Location     Generalized  -DM    Pain Orientation     Other (Comment)   \"OPEN wounds on my back, & post. knees hurt\"  -DM    Pain Descriptors     Aching  -DM    Pain Frequency     Constant/continuous  -DM    Patient's Stated Pain Goal     No pain  -DM    Pain Intervention(s)     Medication (See MAR);Repositioned;Ambulation/increased activity  -DM    Cognitive Assessment/Intervention    Current Cognitive/Communication Assessment     functional  -DM    Orientation Status     oriented x 4  -DM    Follows Commands/Answers Questions     100% of the time;able to follow single-step instructions;needs cueing;needs increased time;needs repetition  -DM    Personal Safety     mild impairment;decreased awareness, need for assist;decreased awareness, need for safety  -DM    " Personal Safety Interventions     elopement precautions initiated;fall prevention program maintained;gait belt;nonskid shoes/slippers when out of bed  -DM    ROM (Range of Motion)    General ROM     lower extremity range of motion deficits identified;upper extremity range of motion deficits identified   B hip flex & abd limited 25 %; R shldr def. d/t RTC injury  -DM    MMT (Manual Muscle Testing)    General MMT Assessment     lower extremity strength deficits identified;upper extremity strength deficits identified   R shldr limit.50-75% d/t RTC inj.; B hips/knees 4- to 4/5   -DM    Bed Mobility, Assessment/Treatment    Bed Mobility, Assistive Device     head of bed elevated  -DM    Bed Mobility, Roll Right, [place]     minimum assist (75% patient effort)  -DM    Bed Mob, Sidelying to Sit, [place]     moderate assist (50% patient effort);verbal cues required   2 trials to come to full sit EOB  -DM    Bed Mobility, Safety Issues     impaired trunk control for bed mobility   LOB post x 2; noted IV leaking;nsg summoned;hep locked  -DM    Bed Mobility, Impairments     strength decreased;ROM decreased;motor control impaired;pain   diffic pushing up from R side lying d/t R RTC injury  -DM    Bed Mobility, Comment     REQ.walking shoes(done);trunk ext. activ.;loaner wx fitted;  -DM    Transfer Assessment/Treatment    Transfers, Sit-Stand [place]     maximum assist (25% patient effort);2 person assist required;verbal cues required   Bfeet blocked to prevent sliding;req.30sec for full upright   -DM    Transfers, Stand-Sit [place]     moderate assist (50% patient effort);2 person assist required;verbal cues required  -DM    Transfers, Sit-Stand-Sit, Assist Device     rolling walker  -DM    Transfer, Safety Issues     sequencing ability decreased;weight-shifting ability decreased;loses balance backward;knees buckling  -DM    Transfer, Impairments     strength decreased;impaired balance;postural control  impaired;pain   B knee instabil;poor trunk control;Vc's for trunk ext  -DM    Transfer, Comment     req. walking shoes for adequate foot support/traction  -DM    Motor Skills/Interventions    Additional Documentation     Balance Skills Training (Group)  -DM    Balance Skills Training    Sitting-Level of Assistance     Close supervision  -DM    Sitting-Balance Support     Left upper extremity supported;Right upper extremity supported;Feet supported  -DM    Sitting-Balance Activities     Lateral lean;Forward lean;Head control activities (Comment);Trunk control activities   scooting; LE exer;IV anchored;trunk ext;PLB Exer  -DM    Sitting # of Minutes     15  -DM    Standing-Level of Assistance     Moderate assistance;x2  -DM    Static Standing Balance Support     assistive device  -DM    Standing-Balance Activities     Weight Shift A-P;Weight Shift R-L  -DM    Standing Balance # of Minutes     1  -DM    Gait Balance-Level of Assistance     Moderate assistance;x2  -DM    Gait Balance Support     assistive device  -DM    Gait Balance Activities     side-stepping  -DM    Gait Balance # of Minutes     1  -DM    Therapy Exercises    Bilateral Lower Extremities     AROM:;AAROM:;10 reps;sitting;ankle pumps/circles;heel slides;hip abduction/adduction;hip ER;hip flexion;hip IR;LAQ;quad sets   pillow squeezes  -DM    Bilateral Upper Extremity     AROM:;10 reps;sitting;elbow flexion/extension   c/o fat.; agreed to do shldr exer later w/son(demo'd each)  -DM    Positioning and Restraints    Pre-Treatment Position     in bed  -DM    Post Treatment Position     chair  -DM    In Chair     sitting;call light within reach;exit alarm on;with family/caregiver;RUE elevated;LUE elevated;legs elevated  -DM      03/06/17 2000                General Information    Equipment Currently Used at Home walker, rolling;lift device   lift chair   -TS        Living Environment    Lives With child(anitra), adult   daughters jonathan and neptali  -         Living Arrangements house  -TS        Home Accessibility stairs to enter home   2  -TS        Number of Stairs to Enter Home 2  -TS        Stair Railings at Home outside, present on right side  -TS        Type of Financial/Environmental Concern none  -TS        Transportation Available car  -TS        Functional Level Prior    Ambulation 1-->assistive equipment  -TS        Transferring 1-->assistive equipment  -TS        Toileting 1-->assistive equipment  -TS        Bathing 1-->assistive equipment  -TS        Dressing 0-->independent  -TS        Eating 0-->independent  -TS        Communication 0-->understands/communicates without difficulty  -TS        Swallowing 0-->swallows foods/liquids without difficulty  -TS        Prior Functional Level Comment uses only walker   -TS          User Key  (r) = Recorded By, (t) = Taken By, (c) = Cosigned By    Initials Name Effective Dates    SJ Thuy Jeter, PT 06/19/15 -     DM Sofiya Ron, PT 06/19/15 -     ST Crystal Awad, RN 06/16/16 -     DALE Becka Barraza 05/02/16 -     TS Orquidea Nelson RN 06/16/16 -     CL Roselyn Batista OT 06/08/16 -     LD Cherry Ordoñez, RN 01/20/17 -            Occupational Therapy Education     Title: PT OT SLP Therapies (Active)     Topic: Occupational Therapy (Active)     Point: ADL training (Active)    Description: Instruct learner(s) on proper safety adaptation and remediation techniques during self care or transfers.   Instruct in proper use of assistive devices.    Learning Progress Summary    Learner Readiness Method Response Comment Documented by Status   Patient Acceptance E,D NR Pt educated on HEP, appropriate safety precautions, appropriate t/f techniques, and benefits of therapy. CL 03/09/17 1120 Active               Point: Home exercise program (Active)    Description: Instruct learner(s) on appropriate technique for monitoring, assisting and/or progressing therapeutic exercises/activities.    Learning Progress Summary     Learner Readiness Method Response Comment Documented by Status   Patient Acceptance E,D NR Pt educated on HEP, appropriate safety precautions, appropriate t/f techniques, and benefits of therapy. CL 03/09/17 1120 Active               Point: Precautions (Active)    Description: Instruct learner(s) on prescribed precautions during self-care and functional transfers.    Learning Progress Summary    Learner Readiness Method Response Comment Documented by Status   Patient Acceptance E,D NR Pt educated on HEP, appropriate safety precautions, appropriate t/f techniques, and benefits of therapy. CL 03/09/17 1120 Active               Point: Body mechanics (Active)    Description: Instruct learner(s) on proper positioning and spine alignment during self-care, functional mobility activities and/or exercises.    Learning Progress Summary    Learner Readiness Method Response Comment Documented by Status   Patient Acceptance E,D NR Pt educated on HEP, appropriate safety precautions, appropriate t/f techniques, and benefits of therapy. CL 03/09/17 1120 Active                      User Key     Initials Effective Dates Name Provider Type Discipline     06/08/16 -  Roselyn Batista OT Occupational Therapist OT                  OT Recommendation and Plan  Anticipated Equipment Needs At Discharge:  (TBD)  Anticipated Discharge Disposition: skilled nursing facility  Planned Therapy Interventions: adaptive equipment training, ADL retraining, balance training, energy conservation, home exercise program, transfer training  Therapy Frequency: daily  Plan of Care Review  Plan Of Care Reviewed With: patient  Progress: progress toward functional goals as expected  Outcome Summary/Follow up Plan: OT eval complete. Pt presents w/ decreased functional independence from baseline. Recommend pt DC to SNF for rehab placement. Recommend cont skilled IPOT intervention for ADL retraining, t/f training, and strengthening.           OT Goals       03/09/17  1122          Transfer Training OT LTG    Transfer Training OT LTG, Date Established 03/09/17  -CL      Transfer Training OT LTG, Time to Achieve by discharge  -CL      Transfer Training OT LTG, Activity Type bed to chair /chair to bed;sit to stand/stand to sit;toilet  -CL      Transfer Training OT LTG, [place] Level contact guard assist  -CL      Transfer Training OT LTG, Additional Goal AAD  -CL      Strength OT LTG    Strength Goal OT LTG, Date Established 03/09/17  -CL      Strength Goal OT LTG, Time to Achieve by discharge  -CL      Strength Goal OT LTG, Measure to Achieve Pt will tolerated BUE AROM/AAROM HEP w/ PLB x15 reps to increase strength/endurance to promote ADL performance.   -CL      UB Dressing OT LTG    UB Dressing Goal OT LTG, Date Established 03/09/17  -CL      UB Dressing Goal OT LTG, Time to Achieve by discharge  -CL      UB Dressing Goal OT LTG, Activity Type Utilizing natalia-dressing technique.   -CL      UB Dressing Goal OT LTG, [place] Level minimum assist (75% patient effort)  -CL      UB Dressing Goal OT LTG, Additional Goal AAD  -CL      Activity Tolerance OT LTG    Activity Tolerance Goal OT LTG, Date Established 03/09/17  -CL      Activity Tolerance Goal OT LTG, Time to Achieve by discharge  -CL      Activity Tolerance Goal OT LTG, Activity Level 15 min activity  -CL      Activity Tolerance Goal OT LTG, Additional Goal x1 seated rest break, O2 sats >89%  -CL        User Key  (r) = Recorded By, (t) = Taken By, (c) = Cosigned By    Initials Name Provider Type    BHAKTI Batista OT Occupational Therapist                Outcome Measures       03/09/17 1048 03/09/17 1035 03/07/17 0825    How much help from another person do you currently need...    Turning from your back to your side while in flat bed without using bedrails?  3  -SJ 2  -DM    Moving from lying on back to sitting on the side of a flat bed without bedrails?  3  -SJ 2  -DM    Moving to and from a bed to a chair  (including a wheelchair)?  3  -SJ 2  -DM    Standing up from a chair using your arms (e.g., wheelchair, bedside chair)?  3  -SJ 2  -DM    Climbing 3-5 steps with a railing?  2  -SJ 1  -DM    To walk in hospital room?  3  -SJ 2  -DM    AM-PAC 6 Clicks Score  17  -SJ 11  -DM    How much help from another is currently needed...    Putting on and taking off regular lower body clothing? 2  -CL      Bathing (including washing, rinsing, and drying) 2  -CL      Toileting (which includes using toilet bed pan or urinal) 1  -CL      Putting on and taking off regular upper body clothing 2  -CL      Taking care of personal grooming (such as brushing teeth) 2  -CL      Eating meals 4  -CL      Score 13  -CL      Functional Assessment    Outcome Measure Options AM-PAC 6 Clicks Daily Activity (OT)  -CL AM-PAC 6 Clicks Basic Mobility (PT)  -SJ AM-PAC 6 Clicks Basic Mobility (PT)  -DM      User Key  (r) = Recorded By, (t) = Taken By, (c) = Cosigned By    Initials Name Provider Type    DANIEL Berg, PT Physical Therapist    YONG Ron, PT Physical Therapist    CL Roselyn Batista OT Occupational Therapist          Time Calculation:   OT Start Time: 1048    Therapy Charges for Today     Code Description Service Date Service Provider Modifiers Qty    37936936778  OT EVAL MOD COMPLEXITY 4 3/9/2017 Roselyn Batista OT GO 1               Roselyn Batista OT  3/9/2017

## 2017-03-10 NOTE — PLAN OF CARE
Problem: Patient Care Overview (Adult)  Goal: Plan of Care Review  Outcome: Unable to achieve outcome(s) by discharge Date Met:  03/10/17    03/10/17 1000   Coping/Psychosocial Response Interventions   Plan Of Care Reviewed With patient;daughter   Patient Care Overview   Progress progress toward functional goals is gradual   Outcome Evaluation   Outcome Summary/Follow up Plan Pt made progress toward all goals, participates well and shoud attain rehab goals with further rx at snf for rehab         Problem: Inpatient Physical Therapy  Goal: Bed Mobility Goal LTG- PT  Outcome: Unable to achieve outcome(s) by discharge Date Met:  03/10/17    03/10/17 1000   Bed Mobility PT LTG   Bed Mobility PT LTG, Date Established 03/10/17   Bed Mobility PT LTG, Outcome goal partially met   Bed Mobility PT LTG, Reason Goal Not Met discharged from facility       Goal: Transfer Training Goal 1 LTG- PT  Outcome: Unable to achieve outcome(s) by discharge Date Met:  03/10/17    03/10/17 1000   Transfer Training PT LTG   Transfer Training PT LTG, Outcome goal partially met   Transfer Training PT LTG, Reason Goal Not Met discharged from facility       Goal: Gait Training Goal LTG- PT  Outcome: Unable to achieve outcome(s) by discharge Date Met:  03/10/17    03/10/17 1000   Gait Training PT LTG   Gait Training Goal PT LTG, Date Established 03/10/17   Gait Training Goal PT LTG, Outcome goal partially met   Gait Training Goal PT LTG, Reason Goal Not Met discharged from facility       Goal: Stair Training Goal LTG- PT  Outcome: Revised    03/10/17 1000   Stair Training PT LTG   Stair Training Goal PT LTG, Date Established 03/10/17   Stair Training Goal PT LTG, Outcome goal no longer appropriate  (pt. d/c ing to rehab facility)

## 2017-03-10 NOTE — DISCHARGE INSTR - APPOINTMENTS
Call to schedule appointment with Dr. Esparza in approx 3 weeks if needed.    Currently recommends BID and PRN cath, allowing her to attempt to void first. Once voiding reliably would d/c caths and would tolerate PVRs in the 2 - 300s as long as patient is still voiding some on her own and not experiencing discomfort/tenderness.

## 2017-03-10 NOTE — THERAPY DISCHARGE NOTE
Acute Care - Physical Therapy Treatment Note/Discharge  [facility]     Patient Name: Pattie Dey  : 1924  MRN: [id number]  Today's Date: 3/10/2017  Onset of Illness/Injury or Date of Surgery Date: 17  Date of Referral to PT: 17  Referring Physician: BONITA Wallace    Admit Date: 3/6/2017    Visit Dx:    ICD-10-CM ICD-9-CM   1. Acute on chronic respiratory failure with hypoxia and hypercapnia J96.21 518.84    J96.22 786.09     799.02   2. COPD exacerbation J44.1 491.21   3. Shortness of breath R06.02 786.05   4. Weakness R53.1 780.79   5. Confusion R41.0 298.9   6. Hypokalemia E87.6 276.8   7. Scabies B86 133.0   8. Impaired functional mobility, balance, gait, and endurance Z74.09 V49.89   9. Impaired mobility and ADLs Z74.09 799.89     Patient Active Problem List   Diagnosis   • Pneumonia   • Coronary artery disease   • Diastolic congestive heart failure   • Hypothyroid   • Dyslipidemia   • Restless leg syndrome   • Atrial fibrillation   • NSTEMI (non-ST elevated myocardial infarction)   • Acute on chronic respiratory failure with hypoxia and hypercapnia   • Sepsis   • Nodule of right lung   • LUE Cellulitis   • Altered mental state   • Hypertension   • Small bowel obstruction   • Carotid bruit   • Anemia   • Fatigue   • Scabies       Physical Therapy Education     Title: PT OT SLP Therapies (Active)     Topic: Physical Therapy (Done)     Point: Mobility training (Done)    Learning Progress Summary    Learner Readiness Method Response Comment Documented by Status   Patient Acceptance E VU  KM 03/10/17 1000 Done    Acceptance E,D NR   17 1111 Active    Eager E,D NR   17 1010 Active   Family Acceptance E VU  KM 03/10/17 1000 Done    Acceptance E,D NR   17 1111 Active    Eager E,D NR   17 1010 Active               Point: Home exercise program (Done)    Learning Progress Summary    Learner Readiness Method Response Comment Documented by Status   Patient Acceptance E VU    03/10/17 1000 Done    Acceptance E,D NR   03/09/17 1111 Active    Eager E,D NR   03/07/17 1010 Active   Family Acceptance E VU   03/10/17 1000 Done    Acceptance E,D NR   03/09/17 1111 Active    Eager E,D NR   03/07/17 1010 Active               Point: Body mechanics (Done)    Learning Progress Summary    Learner Readiness Method Response Comment Documented by Status   Patient Acceptance E VU   03/10/17 1000 Done    Acceptance E,D NR   03/09/17 1111 Active    Eager E,D NR   03/07/17 1010 Active   Family Acceptance E VU  KM 03/10/17 1000 Done    Acceptance E,D NR   03/09/17 1111 Active    Eager E,D NR   03/07/17 1010 Active               Point: Precautions (Done)    Learning Progress Summary    Learner Readiness Method Response Comment Documented by Status   Patient Acceptance E VU   03/10/17 1000 Done    Acceptance E,D NR   03/09/17 1111 Active    Eager E,D NR   03/07/17 1010 Active   Family Acceptance E VU   03/10/17 1000 Done    Acceptance E,D NR   03/09/17 1111 Active    Eager E,D NR   03/07/17 1010 Active                      User Key     Initials Effective Dates Name Provider Type Discipline     06/19/15 -  Thuy Berg, PT Physical Therapist PT     06/19/15 -  Samira Quiros, PT Physical Therapist PT     06/19/15 -  Sofiya Ron, PT Physical Therapist PT                    IP PT Goals       03/10/17 1000 03/09/17 1108 03/07/17 1010    Bed Mobility PT LTG    Bed Mobility PT LTG, Date Established 03/10/17  -KM  03/07/17  -DM    Bed Mobility PT LTG, Time to Achieve   5 days  -DM    Bed Mobility PT LTG, Activity Type   all bed mobility  -DM    Bed Mobility PT LTG, [place] Level  supervision required  - moderate assist (50% patient effort)  -DM    Bed Mobility PT LTG, Outcome goal partially met  -KM goal revised  -     Bed Mobility PT LTG, Reason Goal Not Met discharged from facility  -KM      Transfer Training PT LTG    Transfer Training PT LTG, Date  Established   03/07/17  -DM    Transfer Training PT LTG, Time to Achieve   5 days  -DM    Transfer Training PT LTG, Activity Type   bed to chair /chair to bed;sit to stand/stand to sit  -DM    Transfer Training PT LTG, [place] Level  contact guard assist  -SJ minimum assist (75% patient effort);2 person assist required  -DM    Transfer Training PT LTG, Assist Device   walker, rolling  -DM    Transfer Training PT LTG, Outcome goal partially met  -KM goal revised  -SJ     Transfer Training PT LTG, Reason Goal Not Met discharged from facility  -KM      Gait Training PT LTG    Gait Training Goal PT LTG, Date Established 03/10/17  -KM  03/07/17  -DM    Gait Training Goal PT LTG, Time to Achieve   5 days  -DM    Gait Training Goal PT LTG, [place] Level  contact guard assist  -SJ moderate assist (50% patient effort)  -DM    Gait Training Goal PT LTG, Assist Device   walker, rolling  -DM    Gait Training Goal PT LTG, Distance to Achieve  200  -SJ 25  -DM    Gait Training Goal PT LTG, Outcome goal partially met  -KM goal revised  -SJ     Gait Training Goal PT LTG, Reason Goal Not Met discharged from facility  -KM      Stair Training PT LTG    Stair Training Goal PT LTG, Date Established 03/10/17  -KM  03/07/17  -DM    Stair Training Goal PT LTG, Time to Achieve   5 days  -DM    Stair Training Goal PT LTG, Number of Steps   2  -DM    Stair Training Goal PT LTG, [place] Level   moderate assist (50% patient effort);2 person assist required  -DM    Stair Training Goal PT LTG, Assist Device   1 handrail  -DM    Stair Training Goal PT LTG, Outcome goal no longer appropriate   pt. d/c ing to rehab facility  -KM goal ongoing  -SJ       User Key  (r) = Recorded By, (t) = Taken By, (c) = Cosigned By    Initials Name Provider Type    DANIEL Berg, PT Physical Therapist    KERWIN uQiros, PT Physical Therapist    YONG Ron, PT Physical Therapist              Adult Rehabilitation Note        03/10/17 0925 03/09/17 1035       Rehab Assessment/Intervention    Discipline physical therapist  -KM physical therapist  -     Document Type therapy note (daily note);discharge summary  -KM therapy note (daily note)  -     Subjective Information agree to therapy   plan is to d/c to [facility] for rehab  -KM agree to therapy;complains of;nausea/vomiting  -SJ     Patient Effort, Rehab Treatment good  -KM good  -SJ     Symptoms Noted During/After Treatment  none  -SJ     Precautions/Limitations fall precautions;oxygen therapy device and L/min   contact precautions  -KM fall precautions;oxygen therapy device and L/min  -SJ     Recorded by [KM] Samira Quiros, PT [SJ] Thuy Berg, PT     Vital Signs    Pre Systolic BP Rehab  131  -SJ     Pre Treatment Diastolic BP  63  -SJ     Pretreatment Heart Rate (beats/min)  102  -SJ     Posttreatment Heart Rate (beats/min)  100  -SJ     Pre SpO2 (%) 88  -KM 93  -SJ     O2 Delivery Pre Treatment supplemental O2  -KM supplemental O2  -SJ     O2 Delivery Intra Treatment supplemental O2  -KM      Post SpO2 (%) 91  -KM      O2 Delivery Post Treatment supplemental O2  -KM      Pre Patient Position  Supine  -SJ     Intra Patient Position  Standing  -SJ     Post Patient Position  Sitting  -SJ     Recorded by [KM] Samira Quiros, PT [SJ] Thuy Berg, PT     Pain Assessment    Pain Assessment Atkinson-Dempsey FACES  -KM Atkinson-Dempsey FACES  -SJ     Atkinson-Dempsey FACES Pain Rating 0  -KM 0  -SJ     Pain Score  0  -SJ     Recorded by [KM] Samira Quiros, PT [SJ] Thuy Berg, PT     Cognitive Assessment/Intervention    Current Cognitive/Communication Assessment functional  -KM functional  -SJ     Orientation Status oriented x 4  -KM oriented x 4  -SJ     Follows Commands/Answers Questions able to follow single-step instructions;100% of the time  -% of the time;able to follow single-step instructions;needs cueing  -SJ     Personal Safety mild impairment  -KM  mild impairment;decreased awareness, need for safety  -SJ     Personal Safety Interventions fall prevention program maintained;gait belt;nonskid shoes/slippers when out of bed  -KM fall prevention program maintained;gait belt;muscle strengthening facilitated;nonskid shoes/slippers when out of bed  -SJ     Recorded by [KM] Samira Quiros, PT [SJ] Thuy Berg PT     Bed Mobility, Assessment/Treatment    Bed Mobility, Assistive Device head of bed elevated  -KM head of bed elevated  -SJ     Bed Mobility, Scoot/Bridge, [place] contact guard assist  -KM      Bed Mob, Supine to Sit, [place] contact guard assist  -KM minimum assist (75% patient effort);verbal cues required  -SJ     Bed Mobility, Safety Issues decreased use of arms for pushing/pulling  -KM decreased use of arms for pushing/pulling;decreased use of legs for bridging/pushing  -SJ     Bed Mobility, Impairments  strength decreased;coordination impaired;impaired balance   difficulty using RUE due to RTC dysfunction  -SJ     Recorded by [KM] Samira Quiros, PT [SJ] Thuy eBrg PT     Transfer Assessment/Treatment    Transfers, Sit-Stand [place] minimum assist (75% patient effort);2 person assist required;verbal cues required  -KM minimum assist (75% patient effort);2 person assist required;verbal cues required  -SJ     Transfers, Stand-Sit [place] minimum assist (75% patient effort)  -KM minimum assist (75% patient effort);verbal cues required  -SJ     Transfers, Sit-Stand-Sit, Assist Device rolling walker  -KM rolling walker   rollator  -SJ     Transfer, Safety Issues loses balance backward;weight-shifting ability decreased  -KM weight-shifting ability decreased;loses balance backward  -SJ     Transfer, Impairments impaired balance;postural control impaired  -KM impaired balance;coordination impaired;strength decreased  -SJ     Transfer, Comment  elevated surface  -SJ     Recorded by [KM] Samira Quiros, PT [SJ]  Thuy Berg PT     Gait Assessment/Treatment    Gait, [place] Level contact guard assist  -KM contact guard assist;2 person assist required;verbal cues required  -SJ     Gait, Assistive Device rolling walker  -KM rolling walker   rollator  -SJ     Gait, Distance (Feet) 55  -KM 50  -SJ     Gait, Gait Pattern Analysis  swing-to gait  -SJ     Gait, Gait Deviations forward flexed posture;keira decreased  -KM keira decreased;decreased heel strike;double stance time increased;forward flexed posture  -SJ     Gait, Safety Issues step length decreased  -KM step length decreased;weight-shifting ability decreased;loses balance backward;supplemental O2  -SJ     Gait, Impairments  strength decreased;impaired balance;coordination impaired  -SJ     Gait, Comment  Pt req encouragement to participate, agreed to walking in-room  -SJ     Recorded by [KM] Samira Quiros PT [] Thuy Berg PT     Therapy Exercises    Bilateral Lower Extremities AROM:;10 reps;sitting;ankle pumps/circles;hip flexion;LAQ  -KM AROM:;10 reps;sitting;heel raises;hip flexion;LAQ  -SJ     Recorded by [KM] Samira Quiros, PT [SJ] Thuy Berg PT     Positioning and Restraints    Pre-Treatment Position in bed  -KM in bed  -SJ     Post Treatment Position  chair  -SJ     In Chair reclined;call light within reach;encouraged to call for assist;with family/caregiver;with other staff  -KM reclined;call light within reach;encouraged to call for assist;with family/caregiver;heels elevated  -SJ     Recorded by [KM] Samira Quiros, PT [] Thuy Berg PT       User Key  (r) = Recorded By, (t) = Taken By, (c) = Cosigned By    Initials Name Effective Dates     Thuy Berg PT 06/19/15 -     KM Samira Quiros PT 06/19/15 -           PT Recommendation and Plan  Anticipated Discharge Disposition: home with home health  PT Frequency: daily  Plan of Care Review  Plan Of Care Reviewed With: patient,  daughter  Progress: progress toward functional goals is gradual  Outcome Summary/Follow up Plan: Pt made progress toward all goals, participates well and shoud attain rehab goals with further rx at [facility] for rehab          Outcome Measures       03/10/17 0925 03/09/17 1048 03/09/17 1035    How much help from another person do you currently need...    Turning from your back to your side while in flat bed without using bedrails? 3  -KM  3  -SJ    Moving from lying on back to sitting on the side of a flat bed without bedrails? 3  -KM  3  -SJ    Moving to and from a bed to a chair (including a wheelchair)? 3  -KM  3  -SJ    Standing up from a chair using your arms (e.g., wheelchair, bedside chair)? 2  -KM  3  -SJ    Climbing 3-5 steps with a railing? 1  -KM  2  -SJ    To walk in hospital room? 2  -KM  3  -SJ    AM-PAC 6 Clicks Score 14  -KM  17  -SJ    How much help from another is currently needed...    Putting on and taking off regular lower body clothing?  2  -CL     Bathing (including washing, rinsing, and drying)  2  -CL     Toileting (which includes using toilet bed pan or urinal)  1  -CL     Putting on and taking off regular upper body clothing  2  -CL     Taking care of personal grooming (such as brushing teeth)  2  -CL     Eating meals  4  -CL     Score  13  -CL     Functional Assessment    Outcome Measure Options AM-PAC 6 Clicks Basic Mobility (PT)  -KM AM-PAC 6 Clicks Daily Activity (OT)  -CL AM-PAC 6 Clicks Basic Mobility (PT)  -SJ      User Key  (r) = Recorded By, (t) = Taken By, (c) = Cosigned By    Initials Name Provider Type    DANIEL Berg, PT Physical Therapist    KM Samira Quiros, PT Physical Therapist    CL Roselyn Batista, OT Occupational Therapist           Time Calculation:         PT Charges       03/10/17 1004          Time Calculation    Start Time 0925  -KM      PT Received On 03/10/17  -KM      PT Goal Re-Cert Due Date 03/17/17  -KM      Time Calculation- PT    Total Timed Code  Minutes- PT 24 minute(s)  -KERWIN        User Key  (r) = Recorded By, (t) = Taken By, (c) = Cosigned By    Initials Name Provider Type    KERWIN Quiros, PT Physical Therapist          Therapy Charges for Today     Code Description Service Date Service Provider Modifiers Qty    92486805829 HC GAIT TRAINING EA 15 MIN 3/10/2017 Samira Quiros, PT GP 1    12400608652 HC PT THER PROC EA 15 MIN 3/10/2017 Samira Quiros, PT GP 1    [id number] HC PT THER SUPP EA 15 MIN 3/10/2017 Samira Quiros, PT GP 2          PT G-Codes  PT Professional Judgement Used?: Yes  Outcome Measure Options: AM-PAC 6 Clicks Basic Mobility (PT)  Score: 11  Functional Limitation: Mobility: Walking and moving around  Mobility: Walking and Moving Around Current Status (): At least 60 percent but less than 80 percent impaired, limited or restricted  Mobility: Walking and Moving Around Goal Status (): At least 60 percent but less than 80 percent impaired, limited or restricted    PT Discharge Summary  Reason for Discharge: Discharge from facility  Outcomes Achieved: Patient able to partially acheive established goals  Discharge Destination: SNF    Samira Quiros, PT  3/10/2017

## 2017-03-10 NOTE — PLAN OF CARE
Problem: Respiratory Insufficiency (Adult)  Goal: Identify Related Risk Factors and Signs and Symptoms  Outcome: Ongoing (interventions implemented as appropriate)    03/10/17 0419   Respiratory Insufficiency   Related Risk Factors (Respiratory Insufficiency) activity intolerance   Signs and Symptoms (Respiratory Insufficiency) cough (productive/ineffective);decreased oxygen saturation       Goal: Acid/Base Balance  Outcome: Ongoing (interventions implemented as appropriate)    03/10/17 0419   Respiratory Insufficiency (Adult)   Acid/Base Balance making progress toward outcome       Goal: Effective Ventilation  Outcome: Ongoing (interventions implemented as appropriate)    03/10/17 0419   Respiratory Insufficiency (Adult)   Effective Ventilation making progress toward outcome         Problem: Patient Care Overview (Adult)  Goal: Plan of Care Review  Outcome: Ongoing (interventions implemented as appropriate)    03/10/17 0419   Coping/Psychosocial Response Interventions   Plan Of Care Reviewed With patient   Patient Care Overview   Progress progress toward functional goals as expected       Goal: Adult Individualization and Mutuality  Outcome: Ongoing (interventions implemented as appropriate)  Goal: Discharge Needs Assessment  Outcome: Ongoing (interventions implemented as appropriate)    03/10/17 0419   Discharge Needs Assessment   Concerns To Be Addressed denies needs/concerns at this time   Readmission Within The Last 30 Days no previous admission in last 30 days   Equipment Needed After Discharge none   Discharge Disposition still a patient   Current Health   Anticipated Changes Related to Illness inability to care for self   Self-Care   Equipment Currently Used at Home raised toilet;power chair, (recliner lift);walker, rolling;bath bench   Living Environment   Transportation Available car;family or friend will provide

## 2017-03-10 NOTE — DISCHARGE SUMMARY
[facility] Medicine Services  DISCHARGE SUMMARY       Date of Admission: 3/6/2017  Date of Discharge:  3/10/2017  Primary Care Physician: No Known Provider    Discharge Diagnoses:  Active Hospital Problems (** Indicates Principal Problem)    Diagnosis Date Noted   • **Acute on chronic respiratory failure with hypoxia and hypercapnia [J96.21, J96.22] 07/02/2016   • Fatigue [R53.83] 03/06/2017   • Scabies [B86] 03/06/2017   • Hypertension [I10] 09/07/2016   • Hypothyroid [E03.9]       Resolved Hospital Problems    Diagnosis Date Noted Date Resolved   • COPD exacerbation [J44.1] 07/02/2016 03/10/2017       Presenting Problem/History of Present Illness  Acute on chronic respiratory failure with hypoxia and hypercapnia [J96.21, J96.22]    Chief Complaint on Day of Discharge: f/u dyspnea    History of Present Illness on Day of Discharge:   Patient is sitting up in chair and just finished working with OT.  States she feels better and is ready to go to rehabilitation.  Daughter is at bedside.  Denies chest pain, shortness of breath, or abdominal pain.  States breathing is at baseline.      Hospital Course  Patient is a 89 y.o. female presented to [facility] ED with complaint of fatigue.  Per family,  patient has generalized weakness and increased confusion.  Shortness of breath and moderate respiratory distress on initial presentation to ED.  Patient is normally on BiPAP at night and O2 at 3 L during the day with history of COPD.  She was started on BiPAP in ED and states breathing has improved.  Patient was admitted to the hospital medicine service for further evaluation and management for COPD exacerbation.  Patient was started on IV steroids and antibiotics.  Chest x-ray with cardiomegaly, stable chronic changes seen within the lung fields with no evidence of acute parenchymal disease.  Patient with diastolic CHF, troponin negative, and .  Continue Sotalol, Lasix and Imdur.  Blood cultures are  negative to date.  Patient is currently on oxygen at 4-1/2 L.  Denying shortness of breath.  We'll continue to wean to baseline of 3 L.  She will continue doxycycline for an additional 3 days for total treatment of 7 days.  She will transition to her chronic prednisone dose and continue Brovana nebs, Daliresp and Symbicort.  Patient was noted to have a recent diagnosis of scabies that had not been treated.  Patient completed permethrin treatment during hospitalization.  Patient with complaints of left leg pain.  Left femur and tibial/fibula films were completed with no fracture or dislocation.  Patient has had episodes of urinary retention during hospitalization.  Renal ultrasound was obtained and unremarkable.  Urology was consulted and recommend bladder training with intermittent cath with stretching intervals and cathetering every 10 hours if scan volume greater than 650 ML's for pain, which ever comes first.  She needs to continue with standing and ambulating in rehabilitation which will help as well.  Patient has improved clinically and is medically ready for discharge to rehabilitation today.  Her family will provide transportation.  She is to follow-up with PCP within 1 week.    Procedures Performed:  none       Consults:   Consults     Date and Time Order Name Status Description    3/9/2017 0642 Inpatient Consult to Urology Completed     3/6/2017 2034 Inpatient Consult to Palliative Care MD Completed         Pertinent Test Results:    Results from last 7 days  Lab Units 03/07/17  0559 03/06/17  1711   WBC 10*3/mm3 8.37 9.12   HEMOGLOBIN g/dL 14.4 14.6   HEMATOCRIT % 46.3* 47.4*   PLATELETS 10*3/mm3 248 196       Results from last 7 days  Lab Units 03/10/17  0807 03/08/17  0828 03/07/17  0559   SODIUM mmol/L 135 142 149*   POTASSIUM mmol/L 4.0 3.7 3.4*   CHLORIDE mmol/L 92* 101 101   TOTAL CO2 mmol/L 40.0* 36.0* 38.0*   BUN mg/dL 31* 25* 29*   CREATININE mg/dL 0.70 0.70 0.80   GLUCOSE mg/dL 80 113* 105*    CALCIUM mg/dL 9.1 9.2 9.5     Imaging Results (last 72 hours)     Procedure Component Value Units Date/Time    XR Chest 1 View [[id number]] Collected:  03/06/17 1649     Updated:  03/08/17 1137    Narrative:       EXAMINATION: XR CHEST 1 VIEW-03/06/2017:      INDICATION: CHF/COPD protocol.      COMPARISON: 08/17/2015.     FINDINGS: Portable chest reveals the heart to be enlarged. Vascular  calcification seen within the thoracic aorta. Underlying chronic changes  seen within the lung fields bilaterally. Nodules seen within the right  upper lobe and periphery. Degenerative changes seen within the spine.   No pleural effusion or pneumothorax. Pulmonary vascularity is within  normal limits.           Impression:       Stable chronic changes seen within the lung fields with no  evidence of acute parenchymal disease.     D:  03/06/2017  E:  03/06/2017     This report was finalized on 3/8/2017 11:35 AM by Dr. Amber Mendez MD.       XR Tibia Fibula 2 View Left [[id number]] Collected:  03/07/17 0853     Updated:  03/08/17 1137    Narrative:       EXAMINATION: XR TIBIA AND FIBULA 2 VIEWS, LEFT-03/06/2017:      INDICATION: Pain.      COMPARISON: NONE.     FINDINGS: Two views of left tibia and fibula reveal osteopenia  identified of the bony structures. There is moderate degenerative  changes seen within the left knee. Calcification seen of the menisci.  Vascular calcifications are present. Cortex is intact. No fracture or  dislocation.          Impression:       . Osteopenia identified of the bony structures with no  evidence of fracture or dislocation.     D:  03/06/2017  E:  03/07/2017     This report was finalized on 3/8/2017 11:35 AM by Dr. Amber Mendez MD.       XR Femur 2 View Left [[id number]] Collected:  03/07/17 0853     Updated:  03/08/17 1137    Narrative:       EXAMINATION: XR FEMUR 2 VW LEFT- 03/06/2017     INDICATION: pain      COMPARISON: NONE     FINDINGS: Changes of the left knee reveal severe  osteopenia of the bony  structures. Moderate degenerative changes seen within the knee. Vascular  calcifications identified. No joint space narrowing. Cortex is intact.  No fracture or dislocation.           Impression:       Bony structures are unremarkable with no evidence of acute  fracture or dislocation. Severe osteopenia of the bony structures with  degenerative changes seen within the joint.     D:  03/06/2017  E:  03/07/2017     This report was finalized on 3/8/2017 11:35 AM by Dr. Amber Mendez MD.       US Renal Bilateral [[id number]] Collected:  03/09/17 1658     Updated:  03/09/17 2208    Narrative:       EXAMINATION: US RENAL, BILATERAL-03/09/2017:     INDICATION: Urinary retention, pt with >400 ml on several bladder scans;  J96.21-Acute and chronic respiratory failure with hypoxia; J96.22-Acute  and chronic respiratory failure with hypercapnia; J44.1-Chronic  obstructive pulmonary disease with (acute) exacerbation;  R06.02-Shortness of breath; R53.1-Weakness; R41.0-Disorientation,  unspecified; E87.6-Hypokalemia; B86-Scabies; Z74.09-Other reduced  mobility; Z74.         COMPARISON: NONE.     FINDINGS: Patient history indicates urinary retention.     Kidneys are in the lower range of normal size at 9.1 cm in length on the  right and 9.3 cm on the left. The right kidney shows expected cortical  thinning for age. Cortical echogenicity appears normal. The right kidney  appears otherwise unremarkable except for a simple appearing 14 mm lower  pole cyst. There is no hydronephrosis.     The left kidney has a somewhat lobular outline, and shows normal  cortical thickness and echogenicity for age. Cortex is actually slightly  thicker than on the contralateral side. There is a simple appearing  exophytic upper pole cyst, 13 mm in diameter.     The bladder is moderately distended, up to approximately 10 cm in  maximal diameter. The sonographer indicates a total bladder volume of  approximately 266 mL. No  "ascites is seen.       Impression:       1.  Kidneys appear essentially within normal limits for age. Small  simple appearing bilateral renal cysts.  2.  Moderately distended bladder to approximately 10 cm, or to a volume  of 266 mL. The bladder otherwise appears unremarkable.      D:  03/09/2017  E:  03/09/2017     This report was finalized on 3/9/2017 10:06 PM by DR. Betito Stokes MD.           Condition on Discharge:  stable    Physical Exam on Discharge:  Visit Vitals   • /66 (BP Location: Right arm, Patient Position: Lying)   • Pulse 77   • Temp 97 °F (36.1 °C) (Axillary)   • Resp 16   • Ht 63\" (160 cm)   • Wt 125 lb 6 oz (56.9 kg)   • SpO2 94%   • BMI 22.21 kg/m2     Physical Exam   Constitutional: She is oriented to person, place, and time. She appears well-developed and well-nourished. No distress.   HENT:   Head: Normocephalic and atraumatic.   Eyes: Conjunctivae are normal.   Neck: Normal range of motion. Neck supple.   Cardiovascular: Normal rate, regular rhythm and normal heart sounds.    No murmur heard.  Pulmonary/Chest: Effort normal and breath sounds normal. No respiratory distress. She has no wheezes.   Diminished breath sounds throughout   Abdominal: Soft. Bowel sounds are normal. She exhibits no distension. There is no tenderness.   Musculoskeletal: Normal range of motion. She exhibits no edema.   Neurological: She is alert and oriented to person, place, and time.   Generalized weakness-improving   Skin: Skin is warm and dry.   Skin tear noted on right AC, multiple bruises noted on extremities, fragile skin   Psychiatric: She has a normal mood and affect. Her behavior is normal. Judgment and thought content normal.     Discharge Disposition  Skilled Nursing Facility (DC - External)    Discharge Medications   Pattie Dey   Home Medication Instructions REECE:[id number]    Printed on:03/10/17 1103   Medication Information                      acetaminophen (TYLENOL) 325 MG tablet  Take 650 mg by " mouth every 4 (four) hours as needed for mild pain (1-3) or fever.             amitriptyline (ELAVIL) 50 MG tablet  Take 50 mg by mouth Every Night.             arformoterol (BROVANA) 15 MCG/2ML nebulizer solution  Take 15 mcg by nebulization 2 (two) times a day.             aspirin 81 MG tablet  Take 81 mg by mouth daily.             docusate sodium (COLACE) 100 MG capsule  Take 100 mg by mouth 2 (two) times a day as needed for constipation.             doxycycline (VIBRAMYCIN) 100 MG capsule  Take 1 capsule by mouth Every 12 (Twelve) Hours for 3 days. Indications: Upper Respiratory Tract Infection             ferrous sulfate 325 (65 FE) MG tablet  Take 325 mg by mouth daily.             fluticasone (FLONASE ALLERGY RELIEF) 50 MCG/ACT nasal spray  2 sprays into each nostril Daily.             fluticasone (VERAMYST) 27.5 MCG/SPRAY nasal spray  2 sprays by Each Nare route daily.             furosemide (LASIX) 40 MG tablet  Take 40 mg by mouth daily.             gabapentin (NEURONTIN) 300 MG capsule  Take 2 capsules by mouth Every 12 (Twelve) Hours.             guaiFENesin (MUCINEX) 600 MG 12 hr tablet  Take 600 mg by mouth 2 (two) times a day.             hydrOXYzine (ATARAX) 10 MG tablet  Take 25 mg by mouth Every 8 (Eight) Hours As Needed for itching.             isosorbide mononitrate (IMDUR) 30 MG 24 hr tablet  Take 30 mg by mouth 2 (two) times a day.             levothyroxine (SYNTHROID, LEVOTHROID) 25 MCG tablet  Take 25 mcg by mouth daily.             mometasone-formoterol (DULERA) 200-5 MCG/ACT inhaler  1 puff 2 (two) times a day.             montelukast (SINGULAIR) 10 MG tablet  Take 10 mg by mouth daily.             Multiple Vitamins-Minerals (PRESERVISION AREDS 2 PO)  Take 1 capsule by mouth 2 (two) times a day.             NON FORMULARY  5 mg. AREDS2 EYE SUPPLEMENT BID             nystatin-triamcinolone (MYCOLOG II) 143192-3.1 UNIT/GM-% cream  Apply  topically As Needed.             ondansetron (ZOFRAN) 4  MG tablet  Take 4 mg by mouth every 8 (eight) hours as needed for nausea or vomiting.             polyethylene glycol (MIRALAX) packet  Take 17 g by mouth daily as needed.             potassium chloride (MICRO-K) 10 MEQ CR capsule  Take 10 mEq by mouth daily.             predniSONE (DELTASONE) 10 MG tablet  Take 5 mg by mouth 2 (Two) Times a Day.             ranitidine (ZANTAC) 150 MG tablet  Take 150 mg by mouth every night.             roflumilast (DALIRESP) 500 MCG tablet tablet  Take 500 mcg by mouth daily.             rOPINIRole (REQUIP) 3 MG tablet  Take 3 mg by mouth 3 (three) times a day.             silver sulfadiazine (SILVADENE, SSD) 1 % cream  Apply 1 application topically 2 (two) times a day as needed for wound care.             simvastatin (ZOCOR) 10 MG tablet  Take 10 mg by mouth every night.             sotalol (BETAPACE) 80 MG tablet  Take 0.5 tablets by mouth Every 12 (Twelve) Hours.                 Discharge Diet:   Diet Instructions     Diet: Regular, Cardiac; Thin Liquids, No Restrictions       Discharge Diet:   Regular  Cardiac      Fluid Consistency:  Thin Liquids, No Restrictions               Activity at Discharge:   Activity Instructions     Activity as Tolerated                   Follow-up Appointments  No future appointments.  Additional Instructions for the Follow-ups that You Need to Schedule     Discharge Follow-up with PCP    As directed    Follow Up Details:  within 1 week for hospital follow up               Test Results Pending at Discharge   Order Current Status    Blood Culture Preliminary result    Blood Culture Preliminary result    Urine Culture Preliminary result           Stephania Wallace, APRN 03/10/17 10:13 AM    Time: Discharge 40 min    Please note that portions of this note may have been completed with a voice recognition program. Efforts were made to edit the dictations, but occasionally words are mistranscribed.

## 2017-03-10 NOTE — PLAN OF CARE
Problem: Respiratory Insufficiency (Adult)  Goal: Identify Related Risk Factors and Signs and Symptoms  Outcome: Outcome(s) achieved Date Met:  03/10/17  Goal: Acid/Base Balance  Outcome: Outcome(s) achieved Date Met:  03/10/17  Goal: Effective Ventilation  Outcome: Outcome(s) achieved Date Met:  03/10/17    Problem: Fall Risk (Adult)  Goal: Identify Related Risk Factors and Signs and Symptoms  Outcome: Outcome(s) achieved Date Met:  03/10/17  Goal: Absence of Falls  Outcome: Outcome(s) achieved Date Met:  03/10/17    Problem: Skin Integrity Impairment, Risk/Actual (Adult)  Goal: Identify Related Risk Factors and Signs and Symptoms  Outcome: Outcome(s) achieved Date Met:  03/10/17  Goal: Skin Integrity/Wound Healing  Outcome: Outcome(s) achieved Date Met:  03/10/17    Problem: Patient Care Overview (Adult)  Goal: Plan of Care Review  Outcome: Outcome(s) achieved Date Met:  03/10/17  Goal: Adult Individualization and Mutuality  Outcome: Outcome(s) achieved Date Met:  03/10/17  Goal: Discharge Needs Assessment  Outcome: Outcome(s) achieved Date Met:  03/10/17

## 2017-03-10 NOTE — SIGNIFICANT NOTE
Palliative Team Meeting Attendance  13:00  NAHUN Stover RN, PN              DO KATHRYN Dong M.Div., [facility], [facility]              KATHRYN Mccarthy M.Div., [facility], [facility]              BONITA Marvin, RODNEY, [facility]

## 2017-03-10 NOTE — PROGRESS NOTES
Progress Note    Able to void a bit.  Voided 450 with 250 ml PVR yesterday but unable to void this am with 660 in bladder.  She feels she is getting better and I expect she will improve.  For now, I would rec. BID and prn cath allowing her to attempt to void first.  Once voiding reliably would d/c caths and would tolerate PVRs in 2-300s as long as still voiding.  I can see her in office in a few weeks if still having problems.

## 2017-03-10 NOTE — NURSING NOTE
3/10/17, 0700   Pt did not urinate on own all night. Bladder scanned at 0600, got 622 ml. Performed straight cath and got 700 ml urine out.

## 2017-03-10 NOTE — PLAN OF CARE
Problem: Patient Care Overview (Adult)  Goal: Plan of Care Review  Outcome: Ongoing (interventions implemented as appropriate)    03/10/17 1205   Coping/Psychosocial Response Interventions   Plan Of Care Reviewed With patient   Patient Care Overview   Progress improving   Outcome Evaluation   Outcome Summary/Follow up Plan Planning for transfer today to [facility]. Symptoms managed on current regimen.

## 2017-03-10 NOTE — PROGRESS NOTES
Continued Stay Note  [facility]     Patient Name: Pattie Dey  MRN: [id number]  Today's Date: 3/10/2017    Admit Date: 3/6/2017          Discharge Plan       03/10/17 1031    Case Management/Social Work Plan    Plan [facility]    Patient/Family In Agreement With Plan yes    Additional Comments Per Stephania MESA, patient is medically ready for transfer to SNF today. Verified with Romana at [facility], she states they are ready to accept patient to a skilled bed today. Family will transport via car this afternoon. Nurse to call report to [phone] and fax transfer summary to [phone]. Please send copied chart, scripts and transfer summary with patient. CM following.               Discharge Codes     None        Expected Discharge Date and Time     Expected Discharge Date Expected Discharge Time    Mar 10, 2017             Cleo Rosario, RN

## 2017-03-12 NOTE — THERAPY DISCHARGE NOTE
Acute Care - Occupational Therapy Discharge Summary  [facility]     Patient Name: Pattie Dey  : 1924  MRN: [id number]    Today's Date: 3/12/2017  Onset of Illness/Injury or Date of Surgery Date: 17    Date of Referral to OT: 17  Referring Physician: BONITA Wallace      Admit Date: 3/6/2017        OT Recommendation and Plan    Visit Dx:    ICD-10-CM ICD-9-CM   1. Acute on chronic respiratory failure with hypoxia and hypercapnia J96.21 518.84    J96.22 786.09     799.02   2. COPD exacerbation J44.1 491.21   3. Shortness of breath R06.02 786.05   4. Weakness R53.1 780.79   5. Confusion R41.0 298.9   6. Hypokalemia E87.6 276.8   7. Scabies B86 133.0   8. Impaired functional mobility, balance, gait, and endurance Z74.09 V49.89   9. Impaired mobility and ADLs Z74.09 799.89                     OT Goals       17 1122          Transfer Training OT LTG    Transfer Training OT LTG, Date Established 17  -CL      Transfer Training OT LTG, Time to Achieve by discharge  -CL      Transfer Training OT LTG, Activity Type bed to chair /chair to bed;sit to stand/stand to sit;toilet  -CL      Transfer Training OT LTG, [place] Level contact guard assist  -CL      Transfer Training OT LTG, Additional Goal AAD  -CL      Strength OT LTG    Strength Goal OT LTG, Date Established 17  -CL      Strength Goal OT LTG, Time to Achieve by discharge  -CL      Strength Goal OT LTG, Measure to Achieve Pt will tolerated BUE AROM/AAROM HEP w/ PLB x15 reps to increase strength/endurance to promote ADL performance.   -CL      UB Dressing OT LTG    UB Dressing Goal OT LTG, Date Established 17  -CL      UB Dressing Goal OT LTG, Time to Achieve by discharge  -CL      UB Dressing Goal OT LTG, Activity Type Utilizing natalia-dressing technique.   -CL      UB Dressing Goal OT LTG, [place] Level minimum assist (75% patient effort)  -CL      UB Dressing Goal OT LTG, Additional Goal AAD  -CL      Activity Tolerance  OT LTG    Activity Tolerance Goal OT LTG, Date Established 03/09/17  -CL      Activity Tolerance Goal OT LTG, Time to Achieve by discharge  -CL      Activity Tolerance Goal OT LTG, Activity Level 15 min activity  -CL      Activity Tolerance Goal OT LTG, Additional Goal x1 seated rest break, O2 sats >89%  -CL        User Key  (r) = Recorded By, (t) = Taken By, (c) = Cosigned By    Initials Name Provider Type    CL Roselyn Batista, OT Occupational Therapist                Outcome Measures       03/10/17 0925 03/09/17 1048 03/09/17 1035    How much help from another person do you currently need...    Turning from your back to your side while in flat bed without using bedrails? 3  -KM  3  -SJ    Moving from lying on back to sitting on the side of a flat bed without bedrails? 3  -KM  3  -SJ    Moving to and from a bed to a chair (including a wheelchair)? 3  -KM  3  -SJ    Standing up from a chair using your arms (e.g., wheelchair, bedside chair)? 2  -KM  3  -SJ    Climbing 3-5 steps with a railing? 1  -KM  2  -SJ    To walk in hospital room? 2  -KM  3  -SJ    AM-PAC 6 Clicks Score 14  -KM  17  -SJ    How much help from another is currently needed...    Putting on and taking off regular lower body clothing?  2  -CL     Bathing (including washing, rinsing, and drying)  2  -CL     Toileting (which includes using toilet bed pan or urinal)  1  -CL     Putting on and taking off regular upper body clothing  2  -CL     Taking care of personal grooming (such as brushing teeth)  2  -CL     Eating meals  4  -CL     Score  13  -CL     Functional Assessment    Outcome Measure Options AM-PAC 6 Clicks Basic Mobility (PT)  -KM AM-PAC 6 Clicks Daily Activity (OT)  -CL AM-PAC 6 Clicks Basic Mobility (PT)  -SJ      User Key  (r) = Recorded By, (t) = Taken By, (c) = Cosigned By    Initials Name Provider Type    DANIEL Berg, PT Physical Therapist    KM Samira Quiros, PT Physical Therapist    CL Roselyn Batista OT Occupational  Therapist              OT Discharge Summary  Reason for Discharge: Discharge from facility  Outcomes Achieved: Refer to plan of care for updates on goals achieved  Discharge Destination: SNF      Becka Burgos OT  3/12/2017

## 2017-05-23 PROBLEM — J18.9 HCAP (HEALTHCARE-ASSOCIATED PNEUMONIA): Status: ACTIVE | Noted: 2017-01-01

## 2017-05-23 PROBLEM — Z87.891 FORMER TOBACCO USE: Status: ACTIVE | Noted: 2017-01-01

## 2017-05-23 PROBLEM — J96.01 ACUTE RESPIRATORY FAILURE WITH HYPOXIA (HCC): Status: ACTIVE | Noted: 2017-01-01

## 2017-05-23 PROBLEM — G89.29 CHRONIC PAIN: Status: ACTIVE | Noted: 2017-01-01

## 2017-05-23 PROBLEM — F41.9 ANXIETY: Status: ACTIVE | Noted: 2017-01-01

## 2017-05-23 PROBLEM — K21.9 GERD (GASTROESOPHAGEAL REFLUX DISEASE): Status: ACTIVE | Noted: 2017-01-01

## 2017-05-23 PROBLEM — F32.A DEPRESSION: Status: ACTIVE | Noted: 2017-01-01

## 2017-05-23 PROBLEM — F41.1 GENERALIZED ANXIETY DISORDER: Status: ACTIVE | Noted: 2017-01-01

## 2017-05-26 PROBLEM — A41.02 SEPSIS DUE TO METHICILLIN RESISTANT STAPHYLOCOCCUS AUREUS (MRSA) (HCC): Status: ACTIVE | Noted: 2017-01-01

## 2017-05-28 LAB
BACTERIA SPEC AEROBE CULT: NORMAL

## 2017-10-11 NOTE — NURSING NOTE
Spoke with RN concerning patient. Patient receiving medication for treatment of scabies-no other topical ointments to be applied while treatment in progress. Will follow up with patient this week.   185.42

## 2023-12-19 NOTE — CONSULTS
SCREENING:    Last EGD:   July 2023: three columns of grade II, small varices s/p banding x 3  Needs repeat EGD to assess for additional treatment, ideally when off anticoagulation    Last imaging study:    Dec 2023 CT a/p: no liver lesion, thrombosis as described          CIRRHOSIS COUNSELING:    - strict abstinence of alcohol use  - low sodium (salt) 2000mg per day  - Nutrition: 25-30kcal (calorie per body weight in kilogram) per day  - No need to restrict protein in diet  - High protein diet: 1.2-1.5 gram/kg (protein per body weight in kg) per day to prevent muscle mass loss  - Resistance exercises for muscle strength  - Avoid raw seafoods due to risk of fatal Vibrio vulnificus infection  - Avoid Non-steroidal anti-inflammatory drugs (NSAIDs) such as ibuprofen, Motrin, naproxen, Aleve due to risk of kidney damage  - Can take acetaminophen (tylenol), no more than 2000mg per day  - Compliance with all medications  - Ultrasound or MRI of the liver every 6 months for liver cancer screening  - Upper endoscopy every 1 year for varices   Urology Consult Note    Pattie Dey  LOS: 2      ASSESSMENT:    89 y.o. female with assymptomatic urinary retention//inability to void     DISCUSSION/RECOMMENDATIONS/PLAN:  Would now suggest cont intermittent cath stretching intervals and cathing every 10 hours/SCAN volume >650ml, or pain - whichever comes first.  Cont measures to get her standing and ambulation which will help as well.  Spoke with Christian STEVENS regardinng specifics mentioned above.  Will follow and check results of renal u/s but would expect to be normal.    HPI:  Pattie Dey is a 89 y.o. female who was admitted 3/6/2017  for Acute on chronic respiratory failure with hypoxia and hypercapnia [J96.21, J96.22]  Acute on chronic respiratory failure with hypoxia and hypercapnia [J96.21, J96.22]  Acute on chronic respiratory failure with hypoxia and hypercapnia [J96.21, J96.22]. Patient was referred By Dr Nails for evaluation of inability to void - requiring intermittent cath every 6 hours approx.. Patient has had no symptoms.  Patient has had no significant prior urologic history of previous infection,  trauma,  cancer and  surgery. Patient denies history of previous retention or trouble voiding. Patient has not seen a urologist in the past. Current 24 hour UOP approx 1200 ml - cath volumes 3-4 x's daily in 400s.  She has no sensation or urge to void at those volumes..    Past Medical History   Diagnosis Date   • Anemia    • Anxiety    • Arthritis    • Asthma    • Atrial fibrillation    • Cellulitis    • Chronic pain    • Chronic respiratory failure    • COPD (chronic obstructive pulmonary disease)    • Coronary artery disease    • Depression    • Diastolic congestive heart failure    • Dyslipidemia    • GERD (gastroesophageal reflux disease)    • H/O fracture of rib    • History of MRSA infection of lungs      pneumonia   • History of small bowel obstruction    • Hyperlipidemia    • Hypertension    • Hypothyroid    • Pneumonia    • Restless leg  syndrome    • S/P coronary artery stent placement    • Scabies      Past Surgical History   Procedure Laterality Date   • Hand surgery     • Tonsillectomy     • Coronary stent placement       5   • Cardiac catheterization     • Chest tube insertion       Prescriptions Prior to Admission   Medication Sig Dispense Refill Last Dose   • amitriptyline (ELAVIL) 50 MG tablet Take 50 mg by mouth Every Night.      • fluticasone (FLONASE ALLERGY RELIEF) 50 MCG/ACT nasal spray 2 sprays into each nostril Daily.      • levocetirizine (XYZAL) 5 MG tablet Take 5 mg by mouth Every Evening.      • NON FORMULARY 5 mg. AREDS2 EYE SUPPLEMENT BID      • nystatin-triamcinolone (MYCOLOG II) 974832-2.1 UNIT/GM-% cream Apply  topically As Needed.      • predniSONE (DELTASONE) 10 MG tablet Take 5 mg by mouth 2 (Two) Times a Day.      • acetaminophen (TYLENOL) 325 MG tablet Take 650 mg by mouth every 4 (four) hours as needed for mild pain (1-3) or fever.      • albuterol (ACCUNEB) 1.25 MG/3ML nebulizer solution Take 1 ampule by nebulization 2 (two) times a day as needed for wheezing.   8/16/2016 at Unknown time   • arformoterol (BROVANA) 15 MCG/2ML nebulizer solution Take 15 mcg by nebulization 2 (two) times a day.   8/16/2016 at Unknown time   • aspirin 81 MG tablet Take 81 mg by mouth daily.   8/16/2016 at Unknown time   • docusate sodium (COLACE) 100 MG capsule Take 100 mg by mouth 2 (two) times a day as needed for constipation.   8/16/2016 at Unknown time   • ferrous sulfate 325 (65 FE) MG tablet Take 325 mg by mouth daily.   8/16/2016 at Unknown time   • fexofenadine (ALLEGRA) 180 MG tablet Take 180 mg by mouth daily.   8/16/2016 at Unknown time   • fluticasone (VERAMYST) 27.5 MCG/SPRAY nasal spray 2 sprays by Each Nare route daily.   8/16/2016   • furosemide (LASIX) 40 MG tablet Take 40 mg by mouth daily.   8/16/2016 at Unknown time   • gabapentin (NEURONTIN) 300 MG capsule Take 800 mg by mouth 2 (Two) Times a Day.   8/16/2016 at  Unknown time   • guaiFENesin (MUCINEX) 600 MG 12 hr tablet Take 600 mg by mouth 2 (two) times a day.   8/16/2016 at Unknown time   • hydrOXYzine (ATARAX) 10 MG tablet Take 25 mg by mouth Every 8 (Eight) Hours As Needed for itching.   8/16/2016   • isosorbide mononitrate (IMDUR) 30 MG 24 hr tablet Take 30 mg by mouth 2 (two) times a day.   8/16/2016 at Unknown time   • levothyroxine (SYNTHROID, LEVOTHROID) 25 MCG tablet Take 25 mcg by mouth daily.   8/16/2016 at Unknown time   • mometasone-formoterol (DULERA) 200-5 MCG/ACT inhaler 1 puff 2 (two) times a day.   8/16/2016 at Unknown time   • montelukast (SINGULAIR) 10 MG tablet Take 10 mg by mouth daily.   8/16/2016 at Unknown time   • Multiple Vitamins-Minerals (PRESERVISION AREDS 2 PO) Take 1 capsule by mouth 2 (two) times a day.   8/16/2016 at Unknown time   • ondansetron (ZOFRAN) 4 MG tablet Take 4 mg by mouth every 8 (eight) hours as needed for nausea or vomiting.   8/16/2016 at Unknown time   • polyethylene glycol (MIRALAX) packet Take 17 g by mouth daily as needed.      • potassium chloride (MICRO-K) 10 MEQ CR capsule Take 10 mEq by mouth daily.   8/16/2016 at Unknown time   • ranitidine (ZANTAC) 150 MG tablet Take 150 mg by mouth every night.   8/16/2016 at Unknown time   • roflumilast (DALIRESP) 500 MCG tablet tablet Take 500 mcg by mouth daily.   8/16/2016 at Unknown time   • rOPINIRole (REQUIP) 3 MG tablet Take 3 mg by mouth 3 (three) times a day.   8/16/2016 at Unknown time   • silver sulfadiazine (SILVADENE, SSD) 1 % cream Apply 1 application topically 2 (two) times a day as needed for wound care.   Unknown at Unknown time   • simvastatin (ZOCOR) 10 MG tablet Take 10 mg by mouth every night.   8/16/2016 at Unknown time   • sotalol (BETAPACE) 80 MG tablet Take 40 mg by mouth 2 (two) times a day.   8/16/2016 at Unknown time     Allergies   Allergen Reactions   • Altace [Ramipril]    • Penicillins Other (See Comments), Rash and Swelling     Family History  "  Problem Relation Age of Onset   • Cancer Mother      Pancreatic Cancer in her 50's   • Heart disease Father    • Depression Maternal Uncle    • Heart disease Other    • Cancer Other    • Alzheimer's disease Other    • Pneumonia Other    • Hyperlipidemia Other    • Diabetes Other    • Multiple sclerosis Other      Social History     Social History   • Marital status:      Spouse name: N/A   • Number of children: 12   • Years of education: N/A     Occupational History   • Not on file.     Social History Main Topics   • Smoking status: Former Smoker     Years: 15.00   • Smokeless tobacco: Former User     Quit date: 2014      Comment: quit 100% 1 year ago   • Alcohol use 0.6 - 1.2 oz/week     1 - 2 Glasses of wine per week      Comment: every couple of months   • Drug use: No   • Sexual activity: Defer     Other Topics Concern   • Not on file     Social History Narrative    Currently a resident at [facility], but typically lives with youngest daughter         Review of Systems  Pertinent items are noted in HPI.    Objective     Blood pressure 131/63, pulse 69, temperature 98.3 °F (36.8 °C), temperature source Oral, resp. rate 13, height 63\" (160 cm), weight 125 lb 6 oz (56.9 kg), SpO2 96 %.  Visit Vitals   • /63 (BP Location: Left arm, Patient Position: Lying)   • Pulse 69   • Temp 98.3 °F (36.8 °C) (Oral)   • Resp 13   • Ht 63\" (160 cm)   • Wt 125 lb 6 oz (56.9 kg)   • SpO2 96%   • BMI 22.21 kg/m2       Physicial Exam    General: elderly pleasant and conversant female in NAD    Back: No CVAT   Abdomen: Soft and non-tender with no masses, organomegaly or guarding.  Extremities: FROM with no edema, pulses full          Imaging  none    Labs  Urine Microscopy:   Results from last 7 days  Lab Units 03/07/17  1549   RBC UA /HPF 0-2   WBC UA /HPF 0-2*   , Urinalysis:   Results from last 7 days  Lab Units 03/07/17  1549   PH, URINE  <=5.0   PROTEIN UA  Negative   GLUCOSE UA  Negative   KETONES UA  " Negative   , Urine Culture:   , BMP:   Results from last 7 days  Lab Units 03/08/17  0828  03/06/17  1711   SODIUM mmol/L 142  < > 146   POTASSIUM mmol/L 3.7  < > 3.0*   CALCIUM mg/dL 9.2  < > 9.3   CHLORIDE mmol/L 101  < > 100   TOTAL CO2 mmol/L 36.0*  < > 36.0*   BUN mg/dL 25*  < > 26*   CREATININE mg/dL 0.70  < > 0.80   ALBUMIN g/dL  --   --  3.40   BILIRUBIN mg/dL  --   --  0.4   ALK PHOS U/L  --   --  97   < > = values in this interval not displayed. and CBC:   Results from last 7 days  Lab Units 03/07/17  0559   WBC 10*3/mm3 8.37   RBC 10*6/mm3 4.72   HEMOGLOBIN g/dL 14.4   HEMATOCRIT % 46.3*   PLATELETS 10*3/mm3 248       Unruly Esparza MD - 3/9/2017, NOW@